# Patient Record
Sex: FEMALE | Race: WHITE | NOT HISPANIC OR LATINO | Employment: OTHER | URBAN - METROPOLITAN AREA
[De-identification: names, ages, dates, MRNs, and addresses within clinical notes are randomized per-mention and may not be internally consistent; named-entity substitution may affect disease eponyms.]

---

## 2017-01-05 ENCOUNTER — THERAPY VISIT (OUTPATIENT)
Dept: PHYSICAL THERAPY | Facility: CLINIC | Age: 62
End: 2017-01-05
Payer: COMMERCIAL

## 2017-01-05 DIAGNOSIS — M25.511 ACUTE PAIN OF RIGHT SHOULDER: Primary | ICD-10-CM

## 2017-01-05 PROCEDURE — 97140 MANUAL THERAPY 1/> REGIONS: CPT | Mod: GP | Performed by: PHYSICAL THERAPIST

## 2017-01-05 PROCEDURE — 97110 THERAPEUTIC EXERCISES: CPT | Mod: GP | Performed by: PHYSICAL THERAPIST

## 2017-04-13 NOTE — PROGRESS NOTES
Subjective:    HPI                    Objective:    System    Physical Exam    General     ROS    Assessment/Plan:      DISCHARGE REPORT    Progress reporting period is from 12/16/16 to 1/5/17.       SUBJECTIVE  Subjective changes noted by patient:  Unknown. Patient has failed to return to clinic.    Current pain level is unknown. Patient has failed to return to clinic.  .     Initial Pain level: 5/10.   Changes in function:  Unknown. Patient has failed to return to clinic.  Adverse reaction to treatment or activity: Unknown. Patient has failed to return to clinic.    OBJECTIVE  Changes noted in objective findings:  Patient has failed to return to therapy so current objective findings are unknown.    ASSESSMENT/PLAN  Updated problem list and treatment plan: Diagnosis 1:  Sprain of the Right Rotator Cuff  STG/LTGs have been met or progress has been made towards goals:  Unknown. Patient has failed to return to clinic.  Assessment of Progress: The patient has not returned to therapy. Current status is unknown.  Self Management Plans:  Patient has been instructed in a home treatment program.  Gianna continues to require the following intervention to meet STG and LTG's:  Unknown. Patient has failed to return to clinic.    Recommendations:  Unable to make an accurate recommendation at this time. Patient has failed to return to clinic.    Please refer to the daily flowsheet for treatment today, total treatment time and time spent performing 1:1 timed codes.

## 2017-05-05 ENCOUNTER — OFFICE VISIT (OUTPATIENT)
Dept: FAMILY MEDICINE | Facility: CLINIC | Age: 62
End: 2017-05-05

## 2017-05-05 VITALS
DIASTOLIC BLOOD PRESSURE: 58 MMHG | WEIGHT: 153.6 LBS | SYSTOLIC BLOOD PRESSURE: 106 MMHG | OXYGEN SATURATION: 98 % | HEART RATE: 62 BPM | HEIGHT: 66 IN | BODY MASS INDEX: 24.68 KG/M2 | TEMPERATURE: 97.6 F

## 2017-05-05 DIAGNOSIS — Z71.89 ACP (ADVANCE CARE PLANNING): ICD-10-CM

## 2017-05-05 DIAGNOSIS — E03.9 ACQUIRED HYPOTHYROIDISM: Primary | ICD-10-CM

## 2017-05-05 PROCEDURE — 84439 ASSAY OF FREE THYROXINE: CPT | Mod: 90 | Performed by: PHYSICIAN ASSISTANT

## 2017-05-05 PROCEDURE — 84443 ASSAY THYROID STIM HORMONE: CPT | Mod: 90 | Performed by: PHYSICIAN ASSISTANT

## 2017-05-05 PROCEDURE — 99213 OFFICE O/P EST LOW 20 MIN: CPT | Performed by: PHYSICIAN ASSISTANT

## 2017-05-05 PROCEDURE — 36415 COLL VENOUS BLD VENIPUNCTURE: CPT | Performed by: PHYSICIAN ASSISTANT

## 2017-05-05 RX ORDER — LEVOTHYROXINE SODIUM 112 UG/1
112 TABLET ORAL DAILY
Qty: 90 TABLET | Refills: 3 | Status: SHIPPED | OUTPATIENT
Start: 2017-05-05 | End: 2018-03-20

## 2017-05-05 NOTE — NURSING NOTE
Isidra WOODSON Aubreedevorah is here today for a non fasting medication check.    Pre-Visit Screening :  Immunizations : up to date  Colonoscopy : is up to date  Mammogram : is up to date  Asthma Action Test/Plan : NA  PHQ9/GAD7 :  NA  Pulse - regular  My Chart - accepts    CLASSIFICATION OF OVERWEIGHT AND OBESITY BY BMI                         Obesity Class           BMI(kg/m2)  Underweight                                    < 18.5  Normal                                         18.5-24.9  Overweight                                     25.0-29.9  OBESITY                     I                  30.0-34.9                              II                 35.0-39.9  EXTREME OBESITY             III                >40                             Patient's  BMI Body mass index is 25.17 kg/(m^2).  http://hin.nhlbi.nih.gov/menuplanner/menu.cgi  Questioned patient about current smoking habits.  Pt. has never smoked.  Glendy Rahman, CMA

## 2017-05-05 NOTE — PROGRESS NOTES
SUBJECTIVE:                                                    Isidra Tomas is a 61 year old female who presents to clinic today for the following health issues:    Hypothyroidism Follow-up      Since last visit, patient describes the following symptoms: , no hair loss, no skin changes, no constipation, no loose stools  Wt Readings from Last 5 Encounters:   05/05/17 69.7 kg (153 lb 9.6 oz)   12/08/16 69.3 kg (152 lb 12.8 oz)   05/06/16 68.5 kg (151 lb)   12/17/15 67 kg (147 lb 12.8 oz)   09/23/15 67.4 kg (148 lb 9.6 oz)                Amount of exercise or daily activities, outside of work: walking    Problems taking medications regularly No    Medication side effects: No        ROS:  Constitutional, HEENT, cardiovascular, pulmonary, gi and gu systems are negative, except as otherwise noted.    Problem list, Medication list, Allergies, and Medical/Social/Surgical histories reviewed in [facility] and updated as appropriate.  Labs reviewed in EPIC  BP Readings from Last 3 Encounters:   05/05/17 106/58   12/08/16 120/76   05/06/16 110/70    Wt Readings from Last 3 Encounters:   05/05/17 69.7 kg (153 lb 9.6 oz)   12/08/16 69.3 kg (152 lb 12.8 oz)   05/06/16 68.5 kg (151 lb)                  Patient Active Problem List   Diagnosis     Hypothyroidism     Lumbago     Family history of other cardiovascular diseases     Absence of menstruation     ACP (advance care planning)     Health Care Home     Past Surgical History:   Procedure Laterality Date     COLONOSCOPY  7/31/2014    Normal/ repeat in 10 yrs     HC COLONOSCOPY THRU STOMA, DIAGNOSTIC  6/2005    Normal/ [place] [facility]: repeat in 10 yrs       Social History   Substance Use Topics     Smoking status: Never Smoker     Smokeless tobacco: Never Used     Alcohol use 1.0 oz/week     2 Standard drinks or equivalent per week      Comment: occasional, 2 oz per week     Family History   Problem Relation Age of Onset     Thyroid Disease Maternal Grandmother      HEART DISEASE Father   "     at age 47     Lipids Sister      two sisters with heart disease     HEART DISEASE Mother      valvular disease     Eye Disorder Mother      macular degeneration     C.A.D. Brother       69 yo, [place]         Current Outpatient Prescriptions   Medication Sig Dispense Refill     levothyroxine (SYNTHROID/LEVOTHROID) 112 MCG tablet Take 1 tablet (112 mcg) by mouth daily 90 tablet 3     Cholecalciferol (VITAMIN D) 1000 UNIT capsule Take 1 capsule by mouth daily.       fish oil-omega-3 fatty acids (FISH OIL) 1000 MG capsule Take 2 g by mouth daily.       Multiple Vitamins-Calcium (ONE-A-DAY WOMENS FORMULA) TABS Take 1 tablet by mouth daily.       [DISCONTINUED] levothyroxine (SYNTHROID/LEVOTHROID) 112 MCG tablet Take 1 tablet (112 mcg) by mouth daily 30 tablet 0     Allergies   Allergen Reactions     Erythromycin      does not tolerate well     Recent Labs   Lab Test  16   1206  09/23/15   1407   14   1048   11   0600  11   0500   LDL   --    --    --   127   --    --   106   HDL   --    --    --   94   --    --   85   TRIG   --    --    --   68   --    --   75   CR   --    --    --   0.70   --   0.73   --    GFRESTIMATED   --    --    --   95   --   92   --    POTASSIUM   --    --    --   4.0   --   4.1   --    TSH  0.40  0.48   < >   --    < >   --   0.66    < > = values in this interval not displayed.        OBJECTIVE:                                                    /58 (BP Location: Left arm, Patient Position: Chair, Cuff Size: Adult Large)  Pulse 62  Temp 97.6  F (36.4  C) (Oral)  Ht 1.664 m (5' 5.5\")  Wt 69.7 kg (153 lb 9.6 oz)  LMP 2007  SpO2 98%  Breastfeeding? No  BMI 25.17 kg/m2   Body mass index is 25.17 kg/(m^2).   GENERAL: healthy, alert, well nourished, well hydrated, no distress  HENT: ear canals- normal; TMs- normal; Nose- normal; Mouth- no ulcers, no lesions  NECK: no tenderness, no adenopathy, no asymmetry, no masses, no stiffness; thyroid- normal " to palpation  RESP: lungs clear to auscultation - no rales, no rhonchi, no wheezes  CV: regular rates and rhythm, normal S1 S2, no S3 or S4 and no murmur, no click or rub -         ASSESSMENT/PLAN:                                                    Hypothyroidism; controlled/euthyroid   Plan:  No changes in the patient's current treatment plan      FU for fasting CPE in 2-3 months.       FANNY Fernandez  [facility]

## 2017-05-06 LAB
T4, FREE, NON-DIALYSIS - QUEST: 1.3 NG/DL (ref 0.8–1.8)
TSH SERPL-ACNC: 0.83 MIU/L (ref 0.4–4.5)

## 2017-05-08 ENCOUNTER — TRANSFERRED RECORDS (OUTPATIENT)
Dept: FAMILY MEDICINE | Facility: CLINIC | Age: 62
End: 2017-05-08

## 2017-07-12 ENCOUNTER — OFFICE VISIT (OUTPATIENT)
Dept: FAMILY MEDICINE | Facility: CLINIC | Age: 62
End: 2017-07-12

## 2017-07-12 VITALS
RESPIRATION RATE: 20 BRPM | HEART RATE: 76 BPM | BODY MASS INDEX: 24.75 KG/M2 | DIASTOLIC BLOOD PRESSURE: 72 MMHG | HEIGHT: 66 IN | WEIGHT: 154 LBS | SYSTOLIC BLOOD PRESSURE: 124 MMHG | TEMPERATURE: 97.9 F

## 2017-07-12 DIAGNOSIS — L98.9 SKIN LESION: Primary | ICD-10-CM

## 2017-07-12 PROCEDURE — 99213 OFFICE O/P EST LOW 20 MIN: CPT | Performed by: PHYSICIAN ASSISTANT

## 2017-07-12 NOTE — PROGRESS NOTES
"CC: Lump behind ear    History:  For the past 4 days, Nini has been noticing a lump that is behind left ear. She has a dull ache behind ear, but no pain in actual ear. Tried icing, and took ibuprofen intermittently. No ear pain. No hearing changes. Spent a lot of time outdoors over the weekend, so wondering if she could have been exposed to something. No SOB. No history of ear problems.    PMH, MEDICATIONS, ALLERGIES, SOCIAL AND FAMILY HISTORY in [facility] and reviewed by me personally.      ROS negative other than the symptoms noted above in the HPI.        Examination   /72 (BP Location: Left arm, Patient Position: Chair, Cuff Size: Adult Regular)  Pulse 76  Temp 97.9  F (36.6  C) (Oral)  Resp 20  Ht 1.664 m (5' 5.5\")  Wt 69.9 kg (154 lb)  LMP 03/20/2007  Breastfeeding? No  BMI 25.24 kg/m2       Constitutional: Sitting comfortably, in no acute distress. Vital signs noted  Eyes: pupils equal round reactive to light and accomodation, extra ocular movements intact  Ears: external canals and TMs free of abnormalities  Mouth and throat: without erythema or lesions of the mucosa  Neck:  no adenopathy, trachea midline and normal to palpation  Cardiovascular:  regular rate and rhythm, no murmurs, clicks, or gallops  Respiratory:  normal respiratory rate and rhythm, lungs clear to auscultation  SKIN: No jaundice/pallor. Small, erythematous raised cystic lesion behind left ear. Mobile. Feels fluid filled. No drainage. No vesicular appearance  Psychiatric: mentation appears normal and affect normal/bright        A/P    ICD-10-CM    1. Skin lesion L98.9        DISCUSSION: This appear to be a benign skin swelling likely due to an insect bite. I recommended icing 2 times daily, and taking Allegra or Zyrtec. She should avoid touching the lesion as much as possible. She can take ibuprofen as needed with food to help with any pain. She should contact me in 1 week if not significantly improved, or sooner if " worsening.    follow up visit: As needed    GILMA [facility]

## 2017-07-12 NOTE — MR AVS SNAPSHOT
"              After Visit Summary   7/12/2017    Isidra Tomas    MRN: [id number]           Patient Information     Date Of Birth          1955        Visit Information        Provider Department      7/12/2017 10:00 AM Elaina Baker PA-C [facility]        Today's Diagnoses     Skin lesion    -  1       Follow-ups after your visit        Follow-up notes from your care team     Return in about 2 weeks (around 7/26/2017).      Who to contact     If you have questions or need follow up information about today's clinic visit or your schedule please contact [facility] directly at [phone].  Normal or non-critical lab and imaging results will be communicated to you by MyChart, letter or phone within 4 business days after the clinic has received the results. If you do not hear from us within 7 days, please contact the clinic through GMEXhart or phone. If you have a critical or abnormal lab result, we will notify you by phone as soon as possible.  Submit refill requests through MailLift or call your pharmacy and they will forward the refill request to us. Please allow 3 business days for your refill to be completed.          Additional Information About Your Visit        MyChart Information     MailLift gives you secure access to your electronic health record. If you see a primary care provider, you can also send messages to your care team and make appointments. If you have questions, please call your primary care clinic.  If you do not have a primary care provider, please call [phone] and they will assist you.        Care EveryWhere ID     This is your Care EveryWhere ID. This could be used by other organizations to access your [place] medical records  [id number]        Your Vitals Were     Pulse Temperature Respirations Height Last Period Breastfeeding?    76 97.9  F (36.6  C) (Oral) 20 1.664 m (5' 5.5\") 03/20/2007 No    BMI (Body Mass Index)          "          25.24 kg/m2            Blood Pressure from Last 3 Encounters:   07/12/17 124/72   05/05/17 106/58   12/08/16 120/76    Weight from Last 3 Encounters:   07/12/17 69.9 kg (154 lb)   05/05/17 69.7 kg (153 lb 9.6 oz)   12/08/16 69.3 kg (152 lb 12.8 oz)              Today, you had the following     No orders found for display       Primary Care Provider Office Phone # Fax #    Angelique Bajwa [phone]       [facility] [address]  [facility] [address]        Equal Access to Services     ANJALI BAUER : Hadii renny Sanchez, waaxda deangelo, qaybta kaalmada elizabeth, pelra bustamante . So [facility] [phone].    ATENCIÓN: Si habla español, tiene a lozoya disposición servicios gratuitos de asistencia lingüística. Llame al [phone].    We comply with applicable federal civil rights laws and [place] laws. We do not discriminate on the basis of race, color, national origin, age, disability sex, sexual orientation or gender identity.            Thank you!     Thank you for choosing [facility]  for your care. Our goal is always to provide you with excellent care. Hearing back from our patients is one way we can continue to improve our services. Please take a few minutes to complete the written survey that you may receive in the mail after your visit with us. Thank you!             Your Updated Medication List - Protect others around you: Learn how to safely use, store and throw away your medicines at www.disposemymeds.org.          This list is accurate as of: 7/12/17  3:22 PM.  Always use your most recent med list.                   Brand Name Dispense Instructions for use Diagnosis    fish oil-omega-3 fatty acids 1000 MG capsule      Take 2 g by mouth daily.        levothyroxine 112 MCG tablet    SYNTHROID/LEVOTHROID    90 tablet    Take 1 tablet (112 mcg) by mouth daily    Acquired hypothyroidism        ONE-A-DAY WOMENS FORMULA Tabs      Take 1 tablet by mouth daily.        vitamin D 1000 UNITS capsule      Take 1 capsule by mouth daily.

## 2017-07-12 NOTE — NURSING NOTE
Questioned patient about current smoking habits.  Pt. has never smoked.  PULSE regular  My Chart: stella  CLASSIFICATION OF OVERWEIGHT AND OBESITY BY BMI                        Obesity Class           BMI(kg/m2)  Underweight                                    < 18.5  Normal                                         18.5-24.9  Overweight                                     25.0-29.9  OBESITY                     I                  30.0-34.9                             II                 35.0-39.9  EXTREME OBESITY             III                >40                            Patient's  BMI Body mass index is 25.24 kg/(m^2).  http://hin.nhlbi.nih.gov/menuplanner/menu.cgi  Pre-visit planning  Immunizations - up to date  Colonoscopy - is up to date  Mammogram - is up to date  Asthma -   PHQ9 -    JEET-7 -

## 2017-08-12 ENCOUNTER — HEALTH MAINTENANCE LETTER (OUTPATIENT)
Age: 62
End: 2017-08-12

## 2017-10-20 ENCOUNTER — ALLIED HEALTH/NURSE VISIT (OUTPATIENT)
Dept: FAMILY MEDICINE | Facility: CLINIC | Age: 62
End: 2017-10-20

## 2017-10-20 DIAGNOSIS — Z23 NEED FOR VACCINATION: Primary | ICD-10-CM

## 2017-10-20 PROCEDURE — 90471 IMMUNIZATION ADMIN: CPT | Performed by: FAMILY MEDICINE

## 2017-10-20 PROCEDURE — 90686 IIV4 VACC NO PRSV 0.5 ML IM: CPT | Performed by: FAMILY MEDICINE

## 2017-10-20 NOTE — MR AVS SNAPSHOT
After Visit Summary   10/20/2017    Isidra Tomas    MRN: [id number]           Patient Information     Date Of Birth          1955        Visit Information        Provider Department      10/20/2017 10:00 AM Angelique Bajwa MD [facility]        Today's Diagnoses     Need for vaccination    -  1       Follow-ups after your visit        Your next 10 appointments already scheduled     Nov 16, 2017 10:00 AM CST   Physical-Complete with Angelique Bajwa MD   [facility] ([facility])    [address]  [address]  [facility] [id number] [phone]           Instruct patient that they should have nothing(except water) after midnight the evening prior to the appointment.              Who to contact     If you have questions or need follow up information about today's clinic visit or your schedule please contact [facility] directly at [phone].  Normal or non-critical lab and imaging results will be communicated to you by Obeohart, letter or phone within 4 business days after the clinic has received the results. If you do not hear from us within 7 days, please contact the clinic through LaunchSidet or phone. If you have a critical or abnormal lab result, we will notify you by phone as soon as possible.  Submit refill requests through Artifact Technologies or call your pharmacy and they will forward the refill request to us. Please allow 3 business days for your refill to be completed.          Additional Information About Your Visit        Obeohart Information     [employer] gives you secure access to your electronic health record. If you see a primary care provider, you can also send messages to your care team and make appointments. If you have questions, please call your primary care clinic.  If you do not have a primary care provider, please call [phone] and they will assist you.        Care EveryWhere ID      This is your Care EveryWhere ID. This could be used by other organizations to access your [place] medical records  [id number]        Your Vitals Were     Last Period                   03/20/2007            Blood Pressure from Last 3 Encounters:   07/12/17 124/72   05/05/17 106/58   12/08/16 120/76    Weight from Last 3 Encounters:   07/12/17 69.9 kg (154 lb)   05/05/17 69.7 kg (153 lb 9.6 oz)   12/08/16 69.3 kg (152 lb 12.8 oz)              We Performed the Following     HC FLU VAC PRESRV FREE QUAD SPLIT VIR 3+YRS IM     VACCINE ADMINISTRATION, INITIAL        Primary Care Provider Office Phone # Fax #    Angelique Bajwa [phone] [address] [place] [phone]        Equal Access to Services     ANJALI BAUER : Hadii aad ku hadasho Soomaali, waaxda luqadaha, qaybta kaalmada adeegyada, perla fordin hayaan priya bustamante . So [facility] [phone].    ATENCIÓN: Si habla español, tiene a lozoya disposición servicios gratuitos de asistencia lingüística. Llame al [phone].    We comply with applicable federal civil rights laws and [place] laws. We do not discriminate on the basis of race, color, national origin, age, disability, sex, sexual orientation, or gender identity.            Thank you!     Thank you for choosing [facility]  for your care. Our goal is always to provide you with excellent care. Hearing back from our patients is one way we can continue to improve our services. Please take a few minutes to complete the written survey that you may receive in the mail after your visit with us. Thank you!             Your Updated Medication List - Protect others around you: Learn how to safely use, store and throw away your medicines at www.disposemymeds.org.          This list is accurate as of: 10/20/17 10:19 AM.  Always use your most recent med list.                   Brand Name Dispense Instructions for use Diagnosis    fish oil-omega-3  fatty acids 1000 MG capsule      Take 2 g by mouth daily.        levothyroxine 112 MCG tablet    SYNTHROID/LEVOTHROID    90 tablet    Take 1 tablet (112 mcg) by mouth daily    Acquired hypothyroidism       ONE-A-DAY WOMENS FORMULA Tabs      Take 1 tablet by mouth daily.        vitamin D 1000 UNITS capsule      Take 1 capsule by mouth daily.

## 2017-11-16 ENCOUNTER — OFFICE VISIT (OUTPATIENT)
Dept: FAMILY MEDICINE | Facility: CLINIC | Age: 62
End: 2017-11-16

## 2017-11-16 VITALS
WEIGHT: 155.8 LBS | HEART RATE: 60 BPM | HEIGHT: 66 IN | SYSTOLIC BLOOD PRESSURE: 128 MMHG | DIASTOLIC BLOOD PRESSURE: 88 MMHG | TEMPERATURE: 97.4 F | BODY MASS INDEX: 25.04 KG/M2 | OXYGEN SATURATION: 98 %

## 2017-11-16 DIAGNOSIS — Z82.49 FAMILY HISTORY OF ISCHEMIC HEART DISEASE: ICD-10-CM

## 2017-11-16 DIAGNOSIS — Z01.419 ENCOUNTER FOR GYNECOLOGICAL EXAMINATION WITHOUT ABNORMAL FINDING: Primary | ICD-10-CM

## 2017-11-16 LAB
% GRANULOCYTES: 61.4 %
HCT VFR BLD AUTO: 45.3 % (ref 35–47)
HEMOGLOBIN: 14.4 G/DL (ref 11.7–15.7)
LYMPHOCYTES NFR BLD AUTO: 30 %
MCH RBC QN AUTO: 30.3 PG (ref 26–33)
MCHC RBC AUTO-ENTMCNC: 31.8 G/DL (ref 31–36)
MCV RBC AUTO: 95.1 FL (ref 78–100)
MONOCYTES NFR BLD AUTO: 8.6 %
PLATELET COUNT - QUEST: 309 10^9/L (ref 150–375)
RBC # BLD AUTO: 4.76 10*12/L (ref 3.8–5.2)
WBC # BLD AUTO: 6.4 10*9/L (ref 4–11)

## 2017-11-16 PROCEDURE — 99396 PREV VISIT EST AGE 40-64: CPT | Performed by: FAMILY MEDICINE

## 2017-11-16 PROCEDURE — 36415 COLL VENOUS BLD VENIPUNCTURE: CPT | Performed by: FAMILY MEDICINE

## 2017-11-16 PROCEDURE — 85025 COMPLETE CBC W/AUTO DIFF WBC: CPT | Performed by: FAMILY MEDICINE

## 2017-11-16 PROCEDURE — 80061 LIPID PANEL: CPT | Mod: 90 | Performed by: FAMILY MEDICINE

## 2017-11-16 PROCEDURE — 88142 CYTOPATH C/V THIN LAYER: CPT | Mod: 90 | Performed by: FAMILY MEDICINE

## 2017-11-16 PROCEDURE — 80048 BASIC METABOLIC PNL TOTAL CA: CPT | Mod: 90 | Performed by: FAMILY MEDICINE

## 2017-11-16 NOTE — PROGRESS NOTES
Chief Complaint: Isidra Tomas is an 62 year old woman who presents for preventive health visit.      Besides routine health maintenance,  she would like to discuss    Family history of heart disease- .     Brother  of heart attack 70  Younger sister had heart attack 2014-no coronary artery disease on angio  Dad  of heart attack age 48  Mother  95- natural causes- post hip fracture     recently retired- change in lifestyle-      Health Care Maintenance   Pap smear : due  Mammogram 2017; no family history of breast cancer- diagnostic mammogram a couple of years ago  Colonoscopy   Lipid 2014 Normal    dexa at age 64  Healthy Habits:  Do you get at least three servings of calcium containing foods daily (dairy, green leafy vegetables, etc.)? Yes  Vitamin D  Outside of work or daily activities, how many days per week do you exercise for 30 minutes or longer? Encouraged daily exericse  Have you had an eye exam in the past two years? yes  Do you see a dentist twice per year? yes    PHQ-2  Over the last two weeks- Have you been bothered by little interest or pleasure in doing things?  No  Over the last two weeks- Have you been feeling down, depressed, or hopeless?  No      Advanced directive: completed - bring    Social History   Substance Use Topics     Smoking status: Never Smoker     Smokeless tobacco: Never Used     Alcohol use 1.0 oz/week     2 Standard drinks or equivalent per week      Comment: occasional, 2 oz per week         Reviewed orders with patient.  Reviewed health maintenance and updated orders accordingly - Yes      History of abnormal Pap smear: NO - age 30- 65 PAP every 3 years recommended  All Histories reviewed and updated in Epic.      ROS:   ROS: 7 point ROS neg other than the symptoms noted above in the HPI.  hemorrhoids are bothersome at times- history of constipation     OBJECTIVE:  /88 (BP Location: Left arm, Patient Position: Chair, Cuff Size: Adult Regular)   "Pulse 60  Temp 97.4  F (36.3  C) (Oral)  Ht 1.67 m (5' 5.75\")  Wt 70.7 kg (155 lb 12.8 oz)  LMP 03/20/2007  SpO2 98%  Breastfeeding? No  BMI 25.34 kg/m2  General appearance: Healthy    Skin: Normal. No atypical appearing moles on inspection of trunk and extremities.    External ears  and canals clear bilaterally. TM's normal bilaterally. Nose normal without lesions or discharge. Oropharynx normal. Neck supple without palpable adenopathy.    Breasts are symmetric.  No dominant, discrete, fixed  or suspicious masses are noted.  No skin or nipple changes or axillary nodes.      Regular rate and  rhythm. S1 and S2 normal, no murmurs, clicks, gallops or rubs. No edema or JVD. Chest is clear; no wheezes or rales.    The abdomen is soft without tenderness, guarding, mass or organomegaly. Bowel sounds are normal. No CVA tenderness or inguinal adenopathy noted.    Pelvic:  Vagina and vulva are normal.   No palpable uterine or adnexal masses or tenderness.  Pap obtained and pending.    Rectal exam: normal anus- no digital exam    Extremities: negative.      COUNSELING:  Reviewed preventive health counseling, as reflected in patient instructions       Regular exercise       Healthy diet/nutrition       Osteoporosis Prevention/Bone Health       Advance Care Planning      ATP III Guidelines  ICSI Preventive Guidelines    ASSESSMENT/PLAN:  (Z01.419) Encounter for gynecological examination without abnormal finding  (primary encounter diagnosis)  Comment:   Plan: ThinPrep Pap and HPV (mRNA E6/E7)         ([facility]), BASIC METABOLIC PANEL ([facility]), CL AFF         HEMOGRAM/PLATE/DIFF ([facility]), VENOUS COLLECTION,         Lipid Profile ([facility])            (Z82.49) Family history of ischemic heart disease  Comment:   Plan: CT Coronary Calcium Scan            "

## 2017-11-16 NOTE — MR AVS SNAPSHOT
After Visit Summary   11/16/2017    Isidra Tomas    MRN: [id number]           Patient Information     Date Of Birth          1955        Visit Information        Provider Department      11/16/2017 10:00 AM Angelique Bajwa MD [facility]        Today's Diagnoses     Encounter for gynecological examination without abnormal finding    -  1    Family history of ischemic heart disease          Care Instructions    Metamucil: one or two tablespoons daily with lots of water:    Dried prunes: 2 prunes twice a day          Follow-ups after your visit        Future tests that were ordered for you today     Open Future Orders        Priority Expected Expires Ordered    CT Coronary Calcium Scan Routine 11/17/2017 11/16/2018 11/16/2017            Who to contact     If you have questions or need follow up information about today's clinic visit or your schedule please contact [facility] directly at [phone].  Normal or non-critical lab and imaging results will be communicated to you by MyChart, letter or phone within 4 business days after the clinic has received the results. If you do not hear from us within 7 days, please contact the clinic through [employer] or phone. If you have a critical or abnormal lab result, we will notify you by phone as soon as possible.  Submit refill requests through [employer] or call your pharmacy and they will forward the refill request to us. Please allow 3 business days for your refill to be completed.          Additional Information About Your Visit        MyChart Information     [employer] gives you secure access to your electronic health record. If you see a primary care provider, you can also send messages to your care team and make appointments. If you have questions, please call your primary care clinic.  If you do not have a primary care provider, please call [phone] and they will assist you.        Care EveryWhere ID   "   This is your Care EveryWhere ID. This could be used by other organizations to access your [facility] medical records  [id number]        Your Vitals Were     Pulse Temperature Height Last Period Pulse Oximetry Breastfeeding?    60 97.4  F (36.3  C) (Oral) 1.67 m (5' 5.75\") 03/20/2007 98% No    BMI (Body Mass Index)                   25.34 kg/m2            Blood Pressure from Last 3 Encounters:   11/16/17 128/88   07/12/17 124/72   05/05/17 106/58    Weight from Last 3 Encounters:   11/16/17 70.7 kg (155 lb 12.8 oz)   07/12/17 69.9 kg (154 lb)   05/05/17 69.7 kg (153 lb 9.6 oz)              We Performed the Following     BASIC METABOLIC PANEL ([facility])     CL AFF HEMOGRAM/PLATE/DIFF ([facility])     Lipid Profile ([facility])     ThinPrep Pap and HPV (mRNA E6/E7){HPV-REFLEX} ([facility])     VENOUS COLLECTION        Primary Care Provider Office Phone # Fax #    Angelique Bajwa [phone]       [address] [address]        Equal Access to Services     ANJALI BAUER : Hadii renny adameso Sodionna, waaxda luqadaha, qaybta kaalmada adeegyada, perla hernandez. So [facility] [phone].    ATENCIÓN: Si habla español, tiene a lozoya disposición servicios gratuitos de asistencia lingüística. [facility] [phone].    We comply with applicable federal civil rights laws and [place] laws. We do not discriminate on the basis of race, color, national origin, age, disability, sex, sexual orientation, or gender identity.            Thank you!     Thank you for choosing [facility]  for your care. Our goal is always to provide you with excellent care. Hearing back from our patients is one way we can continue to improve our services. Please take a few minutes to complete the written survey that you may receive in the mail after your visit with us. Thank you!             Your Updated Medication List - Protect others around you: Learn how to safely use, store " and throw away your medicines at www.disposemymeds.org.          This list is accurate as of: 11/16/17 10:44 AM.  Always use your most recent med list.                   Brand Name Dispense Instructions for use Diagnosis    fish oil-omega-3 fatty acids 1000 MG capsule      Take 2 g by mouth daily.        levothyroxine 112 MCG tablet    SYNTHROID/LEVOTHROID    90 tablet    Take 1 tablet (112 mcg) by mouth daily    Acquired hypothyroidism       ONE-A-DAY WOMENS FORMULA Tabs      Take 1 tablet by mouth daily.        vitamin D 1000 UNITS capsule      Take 1 capsule by mouth daily.

## 2017-11-16 NOTE — NURSING NOTE
Isidra is here for CPX    Patient is here for a full physical exam.    Pre-Visit Screening :  Immunizations : up to date  Colon Screening : is up to date  Mammogram: UTD  Asthma Action Test/Plan : NA  PHQ9/GAD7 :  None  Fall Risk Assessment is up to date    Vitals:  Pulse - regular  My Chart - accepts    CLASSIFICATION OF OVERWEIGHT AND OBESITY BY BMI                         Obesity Class           BMI(kg/m2)  Underweight                                    < 18.5  Normal                                         18.5-24.9  Overweight                                     25.0-29.9  OBESITY                     I                  30.0-34.9                              II                 35.0-39.9  EXTREME OBESITY             III                >40                             Patient's  BMI Body mass index is 25.34 kg/(m^2).  http://hin.nhlbi.nih.gov/menuplanner/menu.cgi  Questioned patient about current smoking habits.  Pt. has never smoked.    ETOH screening:  Questions:  1-How often do you have a drink containing alcohol?                             3 times per week(s)  2-How many drinks containing alcohol do you have on a typical day when you are         Drinking?                              2   3- How often do you have 5 or more drinks on one occasion?                              Never  Have you ever:  None of the patient's responses to the CAGE screening were positive / Negative CAGE score

## 2017-11-17 LAB
BUN SERPL-MCNC: 16 MG/DL (ref 7–25)
BUN/CREATININE RATIO: NORMAL (CALC) (ref 6–22)
CALCIUM SERPL-MCNC: 9.4 MG/DL (ref 8.6–10.4)
CHLORIDE SERPLBLD-SCNC: 104 MMOL/L (ref 98–110)
CHOLEST SERPL-MCNC: 231 MG/DL
CHOLEST/HDLC SERPL: 2.3 (CALC)
CO2 SERPL-SCNC: 26 MMOL/L (ref 20–31)
CREAT SERPL-MCNC: 0.67 MG/DL (ref 0.5–0.99)
EGFR AFRICAN AMERICAN - QUEST: 108 ML/MIN/1.73M2
GFR SERPL CREATININE-BSD FRML MDRD: 94 ML/MIN/1.73M2
GLUCOSE - QUEST: 86 MG/DL (ref 65–99)
HDLC SERPL-MCNC: 100 MG/DL
LDLC SERPL CALC-MCNC: 117 MG/DL (CALC)
NONHDLC SERPL-MCNC: 131 MG/DL (CALC)
POTASSIUM SERPL-SCNC: 3.9 MMOL/L (ref 3.5–5.3)
SODIUM SERPL-SCNC: 141 MMOL/L (ref 135–146)
TRIGL SERPL-MCNC: 53 MG/DL

## 2017-11-22 LAB
CLINICAL HISTORY - QUEST: NORMAL
CYTOTECHNOLOGIST - QUEST: NORMAL
DESCRIPTIVE DIAGNOSIS - QUEST: NORMAL
LAST PAP DX - QUEST: NORMAL
LMP - QUEST: NORMAL
PREV BX DX - QUEST: NORMAL
SOURCE: NORMAL
STATEMENT OF ADEQUACY - QUEST: NORMAL

## 2017-12-07 ENCOUNTER — HOSPITAL ENCOUNTER (OUTPATIENT)
Dept: CT IMAGING | Facility: CLINIC | Age: 62
Discharge: HOME | End: 2017-12-07
Attending: FAMILY MEDICINE | Admitting: FAMILY MEDICINE
Payer: COMMERCIAL

## 2017-12-07 DIAGNOSIS — Z82.49 FAMILY HISTORY OF ISCHEMIC HEART DISEASE: ICD-10-CM

## 2017-12-07 PROCEDURE — 75571 CT HRT W/O DYE W/CA TEST: CPT

## 2017-12-07 PROCEDURE — 75571 CT HRT W/O DYE W/CA TEST: CPT | Mod: 26 | Performed by: INTERNAL MEDICINE

## 2018-03-20 DIAGNOSIS — E03.9 ACQUIRED HYPOTHYROIDISM: ICD-10-CM

## 2018-03-21 RX ORDER — LEVOTHYROXINE SODIUM 112 UG/1
TABLET ORAL
Qty: 90 TABLET | Refills: 0 | Status: SHIPPED | OUTPATIENT
Start: 2018-03-21 | End: 2018-06-07

## 2018-03-21 NOTE — TELEPHONE ENCOUNTER
Isidra Tomas is requesting a refill of:    Pending Prescriptions:                       Disp   Refills    levothyroxine (SYNTHROID/LEVOTHROID) 112 *90 tab*0            Sig: TAKE 1 TABLET BY MOUTH  DAILY    Needs OV for refills. This is from her mail order.     TSH   Date Value Ref Range Status   05/05/2017 0.83 0.40 - 4.50 mIU/L Final

## 2018-03-21 NOTE — TELEPHONE ENCOUNTER
Please let the patient know that a 90 day refill has been sent for their prescription.  They are due for a return non-fasting office visit within 30 days.  Failure to schedule an appointment may result in no further refills of his/her medication.

## 2018-05-22 DIAGNOSIS — E03.9 ACQUIRED HYPOTHYROIDISM: ICD-10-CM

## 2018-05-22 RX ORDER — LEVOTHYROXINE SODIUM 112 UG/1
TABLET ORAL
Qty: 90 TABLET | OUTPATIENT
Start: 2018-05-22

## 2018-05-22 RX ORDER — LEVOTHYROXINE SODIUM 112 UG/1
TABLET ORAL
Qty: 30 TABLET | Refills: 0 | Status: CANCELLED | OUTPATIENT
Start: 2018-05-22

## 2018-05-22 NOTE — TELEPHONE ENCOUNTER
Refused Prescriptions:                       Disp   Refills    levothyroxine (SYNTHROID/LEVOTHROID) 112 M*90 tab*         Sig: TAKE 1 TABLET BY MOUTH  DAILY  Refused By: SAMANTA ROSE  Reason for Refusal: Patient needs appointment    This was mail order denied  Will send a 30 to local pharmacy.  [place]  [phone] (home)

## 2018-05-22 NOTE — TELEPHONE ENCOUNTER
Please discuss thyroid refill with patient - ninety days sent to Mail order in March- should have medication for another month  Overdue for thyroid labs-please schedule lab only appointment

## 2018-05-22 NOTE — TELEPHONE ENCOUNTER
Pending Prescriptions:                       Disp   Refills    levothyroxine (SYNTHROID/LEVOTHROID) 112 *30 tab*0            Sig: TAKE 1 TABLET BY MOUTH  DAILY    See RE from 3- pt due for ov  No appt scheduled  Okay for another 30?  Please fax 30 and send to [facility] for a non fasting ov or deny  [place]  [phone] (home)

## 2018-05-22 NOTE — TELEPHONE ENCOUNTER
Spoke with patient and she states that they sent this in error and to please disregard. She stated that she knows she is due for an office visit and still has a month supply of the medication. Closing encounter due to no further action needed.

## 2018-06-07 ENCOUNTER — OFFICE VISIT (OUTPATIENT)
Dept: FAMILY MEDICINE | Facility: CLINIC | Age: 63
End: 2018-06-07

## 2018-06-07 VITALS
WEIGHT: 155.8 LBS | BODY MASS INDEX: 25.04 KG/M2 | SYSTOLIC BLOOD PRESSURE: 128 MMHG | HEIGHT: 66 IN | RESPIRATION RATE: 20 BRPM | TEMPERATURE: 97.9 F | HEART RATE: 52 BPM | DIASTOLIC BLOOD PRESSURE: 82 MMHG

## 2018-06-07 DIAGNOSIS — E03.9 ACQUIRED HYPOTHYROIDISM: Primary | ICD-10-CM

## 2018-06-07 DIAGNOSIS — Z13.9 SCREENING FOR CONDITION: ICD-10-CM

## 2018-06-07 PROCEDURE — 82607 VITAMIN B-12: CPT | Mod: 90 | Performed by: FAMILY MEDICINE

## 2018-06-07 PROCEDURE — 84439 ASSAY OF FREE THYROXINE: CPT | Mod: 90 | Performed by: FAMILY MEDICINE

## 2018-06-07 PROCEDURE — 84443 ASSAY THYROID STIM HORMONE: CPT | Mod: 90 | Performed by: FAMILY MEDICINE

## 2018-06-07 PROCEDURE — 99213 OFFICE O/P EST LOW 20 MIN: CPT | Performed by: FAMILY MEDICINE

## 2018-06-07 PROCEDURE — 36415 COLL VENOUS BLD VENIPUNCTURE: CPT | Performed by: FAMILY MEDICINE

## 2018-06-07 NOTE — MR AVS SNAPSHOT
After Visit Summary   6/7/2018    Isidra Tomas    MRN: [id number]           Patient Information     Date Of Birth          1955        Visit Information        Provider Department      6/7/2018 11:15 AM Angelique Bajwa MD [facility]        Today's Diagnoses     Acquired hypothyroidism    -  1    Screening for condition          Care Instructions    New Recombinant shingles vaccine (Shingrix): call your insurance for information on cost            Follow-ups after your visit        Who to contact     If you have questions or need follow up information about today's clinic visit or your schedule please contact [facility] directly at [phone].  Normal or non-critical lab and imaging results will be communicated to you by MyChart, letter or phone within 4 business days after the clinic has received the results. If you do not hear from us within 7 days, please contact the clinic through [employer] or phone. If you have a critical or abnormal lab result, we will notify you by phone as soon as possible.  Submit refill requests through [employer] or call your pharmacy and they will forward the refill request to us. Please allow 3 business days for your refill to be completed.          Additional Information About Your Visit        MyChart Information     [employer] gives you secure access to your electronic health record. If you see a primary care provider, you can also send messages to your care team and make appointments. If you have questions, please call your primary care clinic.  If you do not have a primary care provider, please call [phone] and they will assist you.        Care EveryWhere ID     This is your Care EveryWhere ID. This could be used by other organizations to access your [facility] medical records  [id number]        Your Vitals Were     Pulse Temperature Respirations Height Last Period BMI (Body Mass Index)    52 97.9  F (36.6  C)  "(Oral) 20 1.67 m (5' 5.75\") 03/20/2007 25.34 kg/m2       Blood Pressure from Last 3 Encounters:   06/07/18 128/82   11/16/17 128/88   07/12/17 124/72    Weight from Last 3 Encounters:   06/07/18 70.7 kg (155 lb 12.8 oz)   11/16/17 70.7 kg (155 lb 12.8 oz)   07/12/17 69.9 kg (154 lb)              We Performed the Following     T4 free     TSH ([facility])     VENOUS COLLECTION     VITAMIN B12 ([facility])          Where to get your medicines      These medications were sent to [facility] - [address]  [address] [place] [address] [address], [facility] [address]     Phone:  [phone]     levothyroxine 112 MCG tablet          Primary Care Provider Office Phone # Fax #    Angelique Bajwa [phone] [address] [address]        Equal Access to Services     [facility]: Hadii renny ku hadasho Soomaali, waaxda luqadaha, qaybta kaalmada adeegyada, waxay luanne haynoemí bustamante . So [facility] [phone].    ATENCIÓN: Si habla español, tiene a lozoya disposición servicios gratuitos de asistencia lingüística. Ll[facility] [phone].    We comply with applicable federal civil rights laws and [place] laws. We do not discriminate on the basis of race, color, national origin, age, disability, sex, sexual orientation, or gender identity.            Thank you!     Thank you for choosing [facility]  for your care. Our goal is always to provide you with excellent care. Hearing back from our patients is one way we can continue to improve our services. Please take a few minutes to complete the written survey that you may receive in the mail after your visit with us. Thank you!             Your Updated Medication List - Protect others around you: Learn how to safely use, store and throw away your medicines at www.disposemymeds.org.          This list is accurate as of 6/7/18 11:59 PM.  Always use your most recent med list.          "          Brand Name Dispense Instructions for use Diagnosis    fish oil-omega-3 fatty acids 1000 MG capsule      Take 2 g by mouth daily.        levothyroxine 112 MCG tablet    SYNTHROID/LEVOTHROID    90 tablet    TAKE 1 TABLET BY MOUTH  DAILY    Acquired hypothyroidism       ONE-A-DAY WOMENS FORMULA Tabs      Take 1 tablet by mouth daily.        vitamin D 1000 units capsule      Take 1 capsule by mouth daily.

## 2018-06-07 NOTE — PROGRESS NOTES
SUBJECTIVE:   Isidra Tomas is a 62 year old female who presents to clinic today for the following health issues:    Hypothyroidism Follow-up: diagnosed in       Since last visit, patient describes the following symptoms: Weight stable, no hair loss, no skin changes, no constipation, no loose stools      Amount of exercise or physical activity: regular walker    Problems taking medications regularly: No    Medication side effects: none    Diet: regular (no restrictions)    Problem list and histories reviewed & adjusted, as indicated.  Additional history: as documented    Patient Active Problem List   Diagnosis     Hypothyroidism     Lumbago     Family history of other cardiovascular diseases     Absence of menstruation     ACP (advance care planning)     Health Care Home     Past Surgical History:   Procedure Laterality Date     COLONOSCOPY  2014    Normal/ repeat in 10 yrs     HC COLONOSCOPY THRU STOMA, DIAGNOSTIC  2005    Normal/ [place] [facility]: repeat in 10 yrs       Social History   Substance Use Topics     Smoking status: Never Smoker     Smokeless tobacco: Never Used     Alcohol use 1.0 oz/week     2 Standard drinks or equivalent per week      Comment: occasional, 2 oz per week     Family History   Problem Relation Age of Onset     Thyroid Disease Maternal Grandmother      HEART DISEASE Father       at age 47     Lipids Sister      two sisters with heart disease     HEART DISEASE Mother      valvular disease     Eye Disorder Mother      macular degeneration     C.A.D. Brother       71 yo, [place]         Current Outpatient Prescriptions   Medication Sig Dispense Refill     Cholecalciferol (VITAMIN D) 1000 UNIT capsule Take 1 capsule by mouth daily.       fish oil-omega-3 fatty acids (FISH OIL) 1000 MG capsule Take 2 g by mouth daily.       levothyroxine (SYNTHROID/LEVOTHROID) 112 MCG tablet TAKE 1 TABLET BY MOUTH  DAILY 90 tablet 3     Multiple Vitamins-Calcium (ONE-A-DAY WOMENS FORMULA) TABS Take  "1 tablet by mouth daily.         Reviewed and updated as needed this visit by clinical staff       Reviewed and updated as needed this visit by Provider         ROS:  Constitutional, HEENT, cardiovascular, pulmonary, gi and gu systems are negative, except as otherwise noted.    OBJECTIVE:     /82 (BP Location: Left arm, Patient Position: Chair, Cuff Size: Adult Regular)  Pulse 52  Temp 97.9  F (36.6  C) (Oral)  Resp 20  Ht 1.67 m (5' 5.75\")  Wt 70.7 kg (155 lb 12.8 oz)  LMP 03/20/2007  BMI 25.34 kg/m2  Body mass index is 25.34 kg/(m^2).   GENERAL: healthy, alert and no distress  NECK: no adenopathy, no asymmetry, masses, or scars and thyroid normal to palpation  RESP: lungs clear to auscultation - no rales, rhonchi or wheezes  CV: regular rate and rhythm, normal S1 S2, no S3 or S4, no murmur, click or rub, no peripheral edema and peripheral pulses strong    Diagnostic Test Results:  Results for orders placed or performed in visit on 06/07/18   TSH ([facility])   Result Value Ref Range    TSH 1.42 0.40 - 4.50 mIU/L   T4 free   Result Value Ref Range    T4 Free, Non-Dialysis 1.4 0.8 - 1.8 ng/dL   VITAMIN B12 ([facility])   Result Value Ref Range    Vitamin B12 446 200 - 1100 pg/mL       ASSESSMENT/PLAN:     Problem List Items Addressed This Visit     Hypothyroidism - Primary    Relevant Medications    levothyroxine (SYNTHROID/LEVOTHROID) 112 MCG tablet      Other Visit Diagnoses     Screening for condition        Relevant Orders    VITAMIN B12 ([facility]) (Completed)           MEDICATIONS:  Continue current medications without change  FUTURE APPOINTMENTS:       - Follow-up visit in 12 months    Angelique Bajwa MD  [facility]  "

## 2018-06-08 LAB
T4, FREE, NON-DIALYSIS - QUEST: 1.4 NG/DL (ref 0.8–1.8)
TSH SERPL-ACNC: 1.42 MIU/L (ref 0.4–4.5)
VIT B12 SERPL-MCNC: 446 PG/ML (ref 200–1100)

## 2018-06-08 RX ORDER — LEVOTHYROXINE SODIUM 112 UG/1
TABLET ORAL
Qty: 90 TABLET | Refills: 3 | Status: SHIPPED | OUTPATIENT
Start: 2018-06-08 | End: 2019-06-20

## 2018-07-25 ENCOUNTER — OFFICE VISIT (OUTPATIENT)
Dept: URGENT CARE | Facility: URGENT CARE | Age: 63
End: 2018-07-25
Payer: COMMERCIAL

## 2018-07-25 VITALS
OXYGEN SATURATION: 98 % | TEMPERATURE: 98 F | HEART RATE: 67 BPM | DIASTOLIC BLOOD PRESSURE: 66 MMHG | BODY MASS INDEX: 24.91 KG/M2 | HEIGHT: 66 IN | WEIGHT: 155 LBS | SYSTOLIC BLOOD PRESSURE: 118 MMHG | RESPIRATION RATE: 16 BRPM

## 2018-07-25 DIAGNOSIS — R10.32 ABDOMINAL DISCOMFORT IN LEFT LOWER QUADRANT: Primary | ICD-10-CM

## 2018-07-25 LAB
ALBUMIN UR-MCNC: NEGATIVE MG/DL
APPEARANCE UR: CLEAR
BACTERIA #/AREA URNS HPF: ABNORMAL /HPF
BILIRUB UR QL STRIP: NEGATIVE
COLOR UR AUTO: YELLOW
GLUCOSE UR STRIP-MCNC: NEGATIVE MG/DL
HGB UR QL STRIP: NEGATIVE
KETONES UR STRIP-MCNC: NEGATIVE MG/DL
LEUKOCYTE ESTERASE UR QL STRIP: ABNORMAL
NITRATE UR QL: NEGATIVE
NON-SQ EPI CELLS #/AREA URNS LPF: ABNORMAL /LPF
PH UR STRIP: 5.5 PH (ref 5–7)
RBC #/AREA URNS AUTO: ABNORMAL /HPF
SOURCE: ABNORMAL
SP GR UR STRIP: <=1.005 (ref 1–1.03)
UROBILINOGEN UR STRIP-ACNC: 0.2 EU/DL (ref 0.2–1)
WBC #/AREA URNS AUTO: ABNORMAL /HPF

## 2018-07-25 PROCEDURE — 99214 OFFICE O/P EST MOD 30 MIN: CPT | Performed by: FAMILY MEDICINE

## 2018-07-25 PROCEDURE — 81001 URINALYSIS AUTO W/SCOPE: CPT | Performed by: FAMILY MEDICINE

## 2018-07-25 NOTE — PROGRESS NOTES
Subjective:   Isidra Tomas is a 62 year old female who presents for   Chief Complaint   Patient presents with     Abdominal Pain     left side pain, above hip area, comes and goes, can be painful x 130pm today     Had pain in left lower quadrant this afternoon. No urinary changes or new symptoms. No fevers. REgular bowel movements. Passing gas. Pain is gone at this very moment.   Not painful at this moment. Did try blueberries today first time in a long while and wondering if this may be the cause. No hx of abdominal surgeries.   PMHX/PSHX/MEDS/ALLERGIES/SHX/FHX reviewed in Epic.    Patient Active Problem List    Diagnosis Date Noted     Health Care Home 06/17/2013     Priority: Medium     State Tier Level:  Tier 1  Status:  n/a  Care Coordinator:    See Letters for [facility] Care Plan             ACP (advance care planning) 07/09/2012     Priority: Medium     Advance Care Planning 9/23/2015: ACP Review and Resources Provided:  Reviewed chart for advance care plan.  Isidra Tomas has no plan or code status on file. Discussed available resources and provided with information. Confirmed code status reflects current choices pending further ACP discussions.  Confirmed/documented legally designated decision maker(s). Added by ELEAZAR DONALD  Advance Care Planning 5/5/2017: ACP Review of Chart / Resources Provided:  Reviewed chart for advance care plan.  Isidra Tomas has no plan or code status on file. Discussed available resources and provided with information.   Added by Glendy Rahman                         Absence of menstruation 04/30/2009     Priority: Medium     Family history of other cardiovascular diseases 04/29/2005     Priority: Medium     Problem list name updated by automated process. Provider to review and confirm  Problem list name updated by automated process. Provider to review       Lumbago 04/01/2004     Priority: Medium     Hypothyroidism 03/28/2003     Priority: Medium     Problem list name  "updated by automated process. Provider to review         Current Outpatient Prescriptions   Medication     Cholecalciferol (VITAMIN D) 1000 UNIT capsule     fish oil-omega-3 fatty acids (FISH OIL) 1000 MG capsule     levothyroxine (SYNTHROID/LEVOTHROID) 112 MCG tablet     Multiple Vitamins-Calcium (ONE-A-DAY WOMENS FORMULA) TABS     No current facility-administered medications for this visit.          ROS:  As above per HPI    Objective:   /66 (BP Location: Right arm, Patient Position: Chair, Cuff Size: Adult Regular)  Pulse 67  Temp 98  F (36.7  C) (Oral)  Resp 16  Ht 5' 6\" (1.676 m)  Wt 155 lb (70.3 kg)  LMP 03/20/2007  SpO2 98%  BMI 25.02 kg/m2, Body mass index is 25.02 kg/(m^2).  Gen:  NAD, well-nourished, sitting in chair comfortably  HEENT: EOMI, PERRL sclera anicteric, Head normocephalic, ; nares patent; oropharynx pink and moist, wears corrective lenses  Neck: trachea midline  CV:  RRR  - no murmurs, rubs, or gallups,   Pulm:  CTAB, no wheezes/rales/rhonchi, no increased work of breathing   ABD: soft, non-distended, no pain with deep palpation in all quadrants  Extrem: no cyanosis, edema or clubbing  Skin: no obvious rashes or abnormalities         Results for orders placed or performed in visit on 07/25/18   UA reflex to Microscopic   Result Value Ref Range    Color Urine Yellow     Appearance Urine Clear     Glucose Urine Negative NEG^Negative mg/dL    Bilirubin Urine Negative NEG^Negative    Ketones Urine Negative NEG^Negative mg/dL    Specific Gravity Urine <=1.005 1.003 - 1.035    Blood Urine Negative NEG^Negative    pH Urine 5.5 5.0 - 7.0 pH    Protein Albumin Urine Negative NEG^Negative mg/dL    Urobilinogen Urine 0.2 0.2 - 1.0 EU/dL    Nitrite Urine Negative NEG^Negative    Leukocyte Esterase Urine Trace (A) NEG^Negative    Source Midstream Urine    Urine Microscopic   Result Value Ref Range    WBC Urine 0 - 5 OTO5^0 - 5 /HPF    RBC Urine O - 2 OTO2^O - 2 /HPF    Squamous Epithelial " /LPF Urine Few FEW^Few /LPF    Bacteria Urine Few (A) NEG^Negative /HPF       Assessment & Plan:   Isidra Tomas, 62 year old female who presents with:  Abdominal discomfort in left lower quadrant  Likely this is due to indigestion or buildup of gas from introducing different foods into the system. Reassuring as symptoms are not present at this time. Unlikely to be kidney stone based on presentation and absence of RBCs in urine. Follow-up as necessary, recommended trying OTC indigestion medications as a trial if symptoms return.   - UA reflex to Microscopic  - Urine Microscopic      Jah Louie MD    URGENT CARE [place]    Options for treatment and/or follow-up care were reviewed with the patient. Isidra Tomas and/or legal guardian was engaged and actively involved in the decision making process. Patient/guardian verbalized understanding of the options discussed and was satisfied with the final plan.

## 2018-07-25 NOTE — MR AVS SNAPSHOT
After Visit Summary   7/25/2018    Isidra Tomas    MRN: [id number]           Patient Information     Date Of Birth          1955        Visit Information        Provider Department      7/25/2018 5:05 PM Jah Louie MD [facility]        Today's Diagnoses     Abdominal discomfort in left lower quadrant    -  1      Care Instructions    Try pepto bismol or maalox to see if this can help with possible indigestion    Avoid foods that you ate today that you don't eat on a regularly basis as this can be a cause of increased gas buildup    Call or Return if symptoms worsen.              Follow-ups after your visit        Who to contact     If you have questions or need follow up information about today's clinic visit or your schedule please contact [facility] directly at [phone].  Normal or non-critical lab and imaging results will be communicated to you by MyChart, letter or phone within 4 business days after the clinic has received the results. If you do not hear from us within 7 days, please contact the clinic through [facility] or phone. If you have a critical or abnormal lab result, we will notify you by phone as soon as possible.  Submit refill requests through PANOSOL or call your pharmacy and they will forward the refill request to us. Please allow 3 business days for your refill to be completed.          Additional Information About Your Visit        [facility] Information     PANOSOL gives you secure access to your electronic health record. If you see a primary care provider, you can also send messages to your care team and make appointments. If you have questions, please call your primary care clinic.  If you do not have a primary care provider, please call [phone] and they will assist you.        Care EveryWhere ID     This is your Care EveryWhere ID. This could be used by other organizations to access your [facility]  "records  [id number]        Your Vitals Were     Pulse Temperature Respirations Height Last Period Pulse Oximetry    67 98  F (36.7  C) (Oral) 16 5' 6\" (1.676 m) 03/20/2007 98%    BMI (Body Mass Index)                   25.02 kg/m2            Blood Pressure from Last 3 Encounters:   07/25/18 118/66   06/07/18 128/82   11/16/17 128/88    Weight from Last 3 Encounters:   07/25/18 155 lb (70.3 kg)   06/07/18 155 lb 12.8 oz (70.7 kg)   11/16/17 155 lb 12.8 oz (70.7 kg)              We Performed the Following     UA reflex to Microscopic     Urine Microscopic        Primary Care Provider Office Phone # Fax #    Angelique Bajwa [phone] [address] [place] [id number]        Equal Access to Services     [facility]: Hadii aad ku hadasho Soomaali, waaxda luqadaha, qaybta kaalmada adeegyada, waxay idiin hayaan priya bustamante . So [facility] [phone].    ATENCIÓN: Si habla español, tiene a lozoya disposición servicios gratuitos de asistencia lingüística. Marcy al [phone].    We comply with applicable federal civil rights laws and [place] laws. We do not discriminate on the basis of race, color, national origin, age, disability, sex, sexual orientation, or gender identity.            Thank you!     Thank you for choosing [facility]  for your care. Our goal is always to provide you with excellent care. Hearing back from our patients is one way we can continue to improve our services. Please take a few minutes to complete the written survey that you may receive in the mail after your visit with us. Thank you!             Your Updated Medication List - Protect others around you: Learn how to safely use, store and throw away your medicines at www.disposemymeds.org.          This list is accurate as of 7/25/18  6:07 PM.  Always use your most recent med list.                   Brand Name Dispense Instructions for use Diagnosis    fish oil-omega-3 fatty " acids 1000 MG capsule      Take 2 g by mouth daily.        levothyroxine 112 MCG tablet    SYNTHROID/LEVOTHROID    90 tablet    TAKE 1 TABLET BY MOUTH  DAILY    Acquired hypothyroidism       ONE-A-DAY WOMENS FORMULA Tabs      Take 1 tablet by mouth daily.        vitamin D 1000 units capsule      Take 1 capsule by mouth daily.

## 2018-07-25 NOTE — PATIENT INSTRUCTIONS
Try pepto bismol or maalox to see if this can help with possible indigestion    Avoid foods that you ate today that you don't eat on a regularly basis as this can be a cause of increased gas buildup    Call or Return if symptoms worsen.

## 2018-09-05 ENCOUNTER — TRANSFERRED RECORDS (OUTPATIENT)
Dept: FAMILY MEDICINE | Facility: CLINIC | Age: 63
End: 2018-09-05

## 2018-10-16 ENCOUNTER — ALLIED HEALTH/NURSE VISIT (OUTPATIENT)
Dept: FAMILY MEDICINE | Facility: CLINIC | Age: 63
End: 2018-10-16

## 2018-10-16 DIAGNOSIS — Z23 NEED FOR VACCINATION: Primary | ICD-10-CM

## 2018-10-16 PROCEDURE — 90686 IIV4 VACC NO PRSV 0.5 ML IM: CPT | Performed by: FAMILY MEDICINE

## 2018-10-16 PROCEDURE — 90471 IMMUNIZATION ADMIN: CPT | Performed by: FAMILY MEDICINE

## 2019-03-08 DIAGNOSIS — E03.9 ACQUIRED HYPOTHYROIDISM: ICD-10-CM

## 2019-03-08 RX ORDER — LEVOTHYROXINE SODIUM 112 UG/1
TABLET ORAL
Qty: 90 TABLET | Refills: 3 | OUTPATIENT
Start: 2019-03-08

## 2019-03-08 NOTE — TELEPHONE ENCOUNTER
Refused Prescriptions:                       Disp   Refills    levothyroxine (SYNTHROID/LEVOTHROID) 112 M*90 tab*3        Sig: TAKE 1 TABLET BY MOUTH  DAILY  Refused By: DUYEN ROSE  Reason for Refusal: Request already responded to by other means (phone, fax, etc.)  Reason for Refusal Comment: refilled 6-8-2018 for one year    Duyen  [phone] (home)

## 2019-06-20 ENCOUNTER — OFFICE VISIT (OUTPATIENT)
Dept: FAMILY MEDICINE | Facility: CLINIC | Age: 64
End: 2019-06-20

## 2019-06-20 VITALS
RESPIRATION RATE: 22 BRPM | WEIGHT: 161 LBS | TEMPERATURE: 98.6 F | HEART RATE: 86 BPM | BODY MASS INDEX: 25.99 KG/M2 | SYSTOLIC BLOOD PRESSURE: 120 MMHG | OXYGEN SATURATION: 94 % | DIASTOLIC BLOOD PRESSURE: 82 MMHG

## 2019-06-20 DIAGNOSIS — J06.9 VIRAL URI WITH COUGH: ICD-10-CM

## 2019-06-20 DIAGNOSIS — E03.9 ACQUIRED HYPOTHYROIDISM: Primary | ICD-10-CM

## 2019-06-20 PROCEDURE — 99214 OFFICE O/P EST MOD 30 MIN: CPT | Performed by: FAMILY MEDICINE

## 2019-06-20 PROCEDURE — 36415 COLL VENOUS BLD VENIPUNCTURE: CPT | Performed by: FAMILY MEDICINE

## 2019-06-20 RX ORDER — LEVOTHYROXINE SODIUM 112 UG/1
TABLET ORAL
Qty: 90 TABLET | Refills: 3 | Status: SHIPPED | OUTPATIENT
Start: 2019-06-20 | End: 2020-08-06

## 2019-06-20 RX ORDER — BENZONATATE 200 MG/1
200 CAPSULE ORAL 3 TIMES DAILY PRN
Qty: 30 CAPSULE | Refills: 0 | Status: SHIPPED | OUTPATIENT
Start: 2019-06-20 | End: 2019-11-07

## 2019-06-20 NOTE — NURSING NOTE
Chief Complaint   Patient presents with     URI     cough, some congestion, sore throat, feels that the congestion is more in her throat then anywhere else, unable to sleep because of cough, no body aches or fevers but does have some fatigue     Recheck Medication     thyroid medication check      Pre-visit Screening:  Immunizations:  not up to date - due for tetanus today   Colonoscopy:  is up to date  Mammogram: is up to date  Asthma Action Test/Plan:  NA  PHQ9:  PHQ-2 done today   GAD7:  No Concerns  Questioned patient about current smoking habits Pt. has never smoked.  Ok to leave detailed message on voice mail for today's visit only Yes, phone # [phone]

## 2019-06-20 NOTE — PROGRESS NOTES
Subjective     Isidra Tomas is a 63 year old female who presents to clinic today for the following health issues:    HPI   Hypothyroidism Follow-up: diagnosed in       Since last visit, patient describes the following symptoms:  no hair loss, no skin changes, no constipation, no loose stools    Weight gain of six pounds in the last year.  Change in lifestyle-  is newly retired      Amount of exercise or physical activity: regular exercise encouraged    Problems taking medications regularly: No    Medication side effects: none    Diet: regular (no restrictions)      PROBLEMS TO ADD ON...  Five day history of upper respiratory symptoms:  Exposed to URI 48 hours before symptoms began  Sore glands  Cough- unable to sleep last night  Sore throat  No fever      Patient Active Problem List   Diagnosis     Hypothyroidism     Lumbago     Family history of other cardiovascular diseases     Absence of menstruation     ACP (advance care planning)     Health Care Home     Past Surgical History:   Procedure Laterality Date     COLONOSCOPY  2014    Normal/ repeat in 10 yrs     HC COLONOSCOPY THRU STOMA, DIAGNOSTIC  2005    Normal/ [place] [facility]: repeat in 10 yrs       Social History     Tobacco Use     Smoking status: Never Smoker     Smokeless tobacco: Never Used   Substance Use Topics     Alcohol use: Yes     Alcohol/week: 1.0 oz     Types: 2 Standard drinks or equivalent per week     Comment: occasional, 2 oz per week     Family History   Problem Relation Age of Onset     Thyroid Disease Maternal Grandmother      Heart Disease Father          at age 47     Lipids Sister         two sisters with heart disease     Heart Disease Mother         valvular disease     Eye Disorder Mother         macular degeneration     C.A.D. Brother          69 yo, [place]         Current Outpatient Medications   Medication Sig Dispense Refill     benzonatate (TESSALON) 200 MG capsule Take 1 capsule (200 mg) by mouth 3 times daily  as needed for cough 30 capsule 0     Cholecalciferol (VITAMIN D) 1000 UNIT capsule Take 1 capsule by mouth daily.       fish oil-omega-3 fatty acids (FISH OIL) 1000 MG capsule Take 2 g by mouth daily.       levothyroxine (SYNTHROID/LEVOTHROID) 112 MCG tablet TAKE 1 TABLET BY MOUTH  DAILY 90 tablet 3     Multiple Vitamins-Calcium (ONE-A-DAY WOMENS FORMULA) TABS Take 1 tablet by mouth daily.        Reviewed and updated as needed this visit by Provider            Review of Systems   ROS COMP: Constitutional, HEENT, cardiovascular, pulmonary, GI, , musculoskeletal, neuro, skin, endocrine and psych systems are negative, except as otherwise noted.      Objective    /82 (BP Location: Left arm, Patient Position: Sitting, Cuff Size: Adult Regular)   Pulse 86   Temp 98.6  F (37  C) (Oral)   Resp 22   Wt 73 kg (161 lb)   LMP 03/20/2007   SpO2 94%   BMI 25.99 kg/m    Body mass index is 25.99 kg/m .  Physical Exam   GENERAL: healthy, alert and no distress  External ears  and canals clear bilaterally. TM's normal bilaterally. Nose normal without lesions or discharge. Oropharynx: erythema of the posterior pharynx.   NECK: no adenopathy, no asymmetry, masses, or scars and thyroid normal to palpation  RESP: lungs clear to auscultation - no rales, rhonchi or wheezes  CV: regular rate and rhythm, normal S1 S2, no S3 or S4, no murmur, click or rub, no peripheral edema and peripheral pulses strong  MS: no gross musculoskeletal defects noted, no edema    Diagnostic Test Results:  No results found for this or any previous visit (from the past 24 hour(s)).        Assessment & Plan    (E03.9) Acquired hypothyroidism  (primary encounter diagnosis)  Comment: requests written RX- results are pending  Plan: TSH ([facility]), T4 free, VENOUS COLLECTION,         levothyroxine (SYNTHROID/LEVOTHROID) 112 MCG         tablet,            (J06.9,  B97.89) Viral URI with cough  Comment:   Plan:  benzonatate (TESSALON) 200 MG capsule  Ibuprofen  or tylenol  Throat lozenges  Call back on Monday if not better    Recheck thyroid in one year  Angelique Bajwa MD  [facility]

## 2019-06-21 LAB
T4, FREE, NON-DIALYSIS - QUEST: 1.3 NG/DL (ref 0.8–1.8)
TSH SERPL-ACNC: 2.88 MIU/L (ref 0.4–4.5)

## 2019-06-23 ENCOUNTER — OFFICE VISIT (OUTPATIENT)
Dept: URGENT CARE | Facility: URGENT CARE | Age: 64
End: 2019-06-23
Payer: COMMERCIAL

## 2019-06-23 VITALS
TEMPERATURE: 97.8 F | HEART RATE: 83 BPM | DIASTOLIC BLOOD PRESSURE: 82 MMHG | SYSTOLIC BLOOD PRESSURE: 109 MMHG | OXYGEN SATURATION: 95 %

## 2019-06-23 DIAGNOSIS — R07.0 THROAT PAIN: Primary | ICD-10-CM

## 2019-06-23 LAB
DEPRECATED S PYO AG THROAT QL EIA: NORMAL
SPECIMEN SOURCE: NORMAL

## 2019-06-23 PROCEDURE — 99213 OFFICE O/P EST LOW 20 MIN: CPT | Performed by: FAMILY MEDICINE

## 2019-06-23 PROCEDURE — 87880 STREP A ASSAY W/OPTIC: CPT | Performed by: FAMILY MEDICINE

## 2019-06-23 PROCEDURE — 87081 CULTURE SCREEN ONLY: CPT | Performed by: FAMILY MEDICINE

## 2019-06-23 RX ORDER — DIPHENHYDRAMINE HYDROCHLORIDE AND LIDOCAINE HYDROCHLORIDE AND ALUMINUM HYDROXIDE AND MAGNESIUM HYDRO
5-10 KIT EVERY 6 HOURS PRN
Qty: 1 BOTTLE | Refills: 0 | Status: SHIPPED | OUTPATIENT
Start: 2019-06-23 | End: 2019-11-07

## 2019-06-23 RX ORDER — CEFDINIR 300 MG/1
300 CAPSULE ORAL 2 TIMES DAILY
Qty: 20 CAPSULE | Refills: 0 | Status: SHIPPED | OUTPATIENT
Start: 2019-06-23 | End: 2019-11-07

## 2019-06-23 RX ORDER — CODEINE PHOSPHATE AND GUAIFENESIN 10; 100 MG/5ML; MG/5ML
1-2 SOLUTION ORAL EVERY 4 HOURS PRN
Qty: 120 ML | Refills: 0 | Status: SHIPPED | OUTPATIENT
Start: 2019-06-23 | End: 2019-11-07

## 2019-06-23 NOTE — PROGRESS NOTES
SUBJECTIVE: Isidra Tomas is a 63 year old female patient complaining of sinus congestion for 10 day(s).     OBJECTIVE: The patient appears moderate distress.   EARS: positive findings: RT side erythematous  NOSE/SINUS: positive findings: mucosa erythematous and swollen  Sinus palpation: Maxillary sinus tender to palpation   THROAT: moderate erythema   NECK:positive findings: moderate anterior cervical nodes   CHEST: Clear    ASSESSMENT: Acute Sinusitis  OM    PLAN: See orders.   In addition, I have suggested that the patient   psuh fluids.

## 2019-06-24 LAB
BACTERIA SPEC CULT: NORMAL
SPECIMEN SOURCE: NORMAL

## 2019-06-26 ENCOUNTER — TRANSFERRED RECORDS (OUTPATIENT)
Dept: FAMILY MEDICINE | Facility: CLINIC | Age: 64
End: 2019-06-26

## 2019-06-27 ENCOUNTER — TELEPHONE (OUTPATIENT)
Dept: FAMILY MEDICINE | Facility: CLINIC | Age: 64
End: 2019-06-27

## 2019-06-27 NOTE — TELEPHONE ENCOUNTER
Continue antibiotic-  Add decongestant to her current medications (sudafed)    I can see her tomorrow if she prefers  Please call

## 2019-06-27 NOTE — TELEPHONE ENCOUNTER
Spoke with pt. Advised her to continue the antibiotic and add in a decongestant. Pt understood and had no further questions. She stated she would like to finish the antibiotics and will call if not getting better from there.

## 2019-06-27 NOTE — TELEPHONE ENCOUNTER
Pt called in to update you on how her viral URI is doing. She stated she was in urgent care on Sunday. The cough is still ongoing and her ears feel very plugged. Pt is wondering what else she should be doing as this is not improving.    Please advise # [phone]    Thanks, Chanda

## 2019-07-08 ENCOUNTER — TRANSFERRED RECORDS (OUTPATIENT)
Dept: FAMILY MEDICINE | Facility: CLINIC | Age: 64
End: 2019-07-08

## 2019-07-11 PROBLEM — C43.61 MALIGNANT MELANOMA OF SKIN OF UPPER LIMB, INCLUDING SHOULDER, RIGHT (H): Status: ACTIVE | Noted: 2019-07-11

## 2019-07-23 ENCOUNTER — TRANSFERRED RECORDS (OUTPATIENT)
Dept: FAMILY MEDICINE | Facility: CLINIC | Age: 64
End: 2019-07-23

## 2019-09-12 ENCOUNTER — TRANSFERRED RECORDS (OUTPATIENT)
Dept: FAMILY MEDICINE | Facility: CLINIC | Age: 64
End: 2019-09-12

## 2019-09-12 LAB — MAMMOGRAM: NORMAL

## 2019-10-09 ENCOUNTER — TRANSFERRED RECORDS (OUTPATIENT)
Dept: FAMILY MEDICINE | Facility: CLINIC | Age: 64
End: 2019-10-09

## 2019-10-15 ENCOUNTER — ALLIED HEALTH/NURSE VISIT (OUTPATIENT)
Dept: FAMILY MEDICINE | Facility: CLINIC | Age: 64
End: 2019-10-15

## 2019-10-15 DIAGNOSIS — Z23 NEED FOR VACCINATION: Primary | ICD-10-CM

## 2019-10-15 PROCEDURE — 90686 IIV4 VACC NO PRSV 0.5 ML IM: CPT | Performed by: FAMILY MEDICINE

## 2019-10-15 PROCEDURE — G0008 ADMIN INFLUENZA VIRUS VAC: HCPCS | Performed by: FAMILY MEDICINE

## 2019-11-07 ENCOUNTER — OFFICE VISIT (OUTPATIENT)
Dept: FAMILY MEDICINE | Facility: CLINIC | Age: 64
End: 2019-11-07

## 2019-11-07 VITALS
WEIGHT: 163 LBS | DIASTOLIC BLOOD PRESSURE: 72 MMHG | BODY MASS INDEX: 26.31 KG/M2 | TEMPERATURE: 97.8 F | OXYGEN SATURATION: 99 % | SYSTOLIC BLOOD PRESSURE: 118 MMHG | HEART RATE: 61 BPM

## 2019-11-07 DIAGNOSIS — Z23 NEED FOR VACCINATION: ICD-10-CM

## 2019-11-07 DIAGNOSIS — H69.91 DYSFUNCTION OF RIGHT EUSTACHIAN TUBE: Primary | ICD-10-CM

## 2019-11-07 PROCEDURE — 92567 TYMPANOMETRY: CPT | Performed by: FAMILY MEDICINE

## 2019-11-07 PROCEDURE — 99213 OFFICE O/P EST LOW 20 MIN: CPT | Mod: 25 | Performed by: FAMILY MEDICINE

## 2019-11-07 PROCEDURE — G0009 ADMIN PNEUMOCOCCAL VACCINE: HCPCS | Performed by: FAMILY MEDICINE

## 2019-11-07 PROCEDURE — 90670 PCV13 VACCINE IM: CPT | Performed by: FAMILY MEDICINE

## 2019-11-07 RX ORDER — FLUTICASONE PROPIONATE 50 MCG
2 SPRAY, SUSPENSION (ML) NASAL DAILY
Qty: 16 G | Refills: 0 | Status: SHIPPED | OUTPATIENT
Start: 2019-11-07 | End: 2020-08-06

## 2019-11-07 NOTE — LETTER
[facility]                 [facility]  [address]  [place]  [address]        For Emergencies:  Call 911      For Clinic Appointments:   [phone]                       Call me in thirty days if ear symptoms did not resolve      [facility]   [address] [place] [address]  [address]   [facility] [address]   [phone] - appt line   [phone] -  fax #

## 2019-11-07 NOTE — NURSING NOTE
Chief Complaint   Patient presents with     Ear Problem     right ear is feeling plugged     Pre-visit Screening:  Immunizations:  not up to date   Colonoscopy:  is up to date  Mammogram: is up to date  Asthma Action Test/Plan:  NA  PHQ9:  NA  GAD7:  NA  Questioned patient about current smoking habits Pt. has never smoked.  Ok to leave detailed message on voice mail for today's visit only yes, phone # [phone]

## 2019-11-07 NOTE — PROGRESS NOTES
"SUBJECTIVE:  64 year old female presents with the following concern:    Both ears felt plugged in June  She was treated for a sinus infection- left with residual symptoms- every morning her right ear still feels plugged   she denies sinus- frontal pain  She feels \"nasally\"  She denies tinnitus  She denies hearing loss      Plugging is not a constant symptom    Patient Active Problem List   Diagnosis     Hypothyroidism     Lumbago     Family history of other cardiovascular diseases     Absence of menstruation     ACP (advance care planning)     Health Care Home     Malignant melanoma of skin of upper limb, including shoulder, right (H)     Past Surgical History:   Procedure Laterality Date     COLONOSCOPY  7/31/2014    Normal/ repeat in 10 yrs     HC COLONOSCOPY THRU STOMA, DIAGNOSTIC  6/2005    [place] [facility]: repeat in 10 yrs     Current Outpatient Medications   Medication     Cholecalciferol (VITAMIN D) 1000 UNIT capsule     fluticasone (FLONASE) 50 MCG/ACT nasal spray     levothyroxine (SYNTHROID/LEVOTHROID) 112 MCG tablet     Multiple Vitamins-Calcium (ONE-A-DAY WOMENS FORMULA) TABS     No current facility-administered medications for this visit.       ROS: 7 point ROS neg other than the symptoms noted above in the HPI.          OBJECTIVE:  /72 (BP Location: Left arm, Patient Position: Sitting, Cuff Size: Adult Regular)   Pulse 61   Temp 97.8  F (36.6  C) (Oral)   Wt 73.9 kg (163 lb)   LMP 03/20/2007   SpO2 99%   BMI 26.31 kg/m     External ears  and canals clear bilaterally. TM's normal bilaterally. Nose normal without lesions or discharge. Oropharynx normal. Neck supple without palpable adenopathy.    Normal tympanogram-     Assessment    (H69.81) Dysfunction of right eustachian tube  (primary encounter diagnosis)  Comment:  If flonase dose not resolve symptoms, ENT consult advised  Plan: TYMPANOMETRY, fluticasone (FLONASE) 50 MCG/ACT         nasal spray            (Z23) Need for " vaccination  Comment:   Plan: PNEUMOCOCCAL CONJ VACCINE 13 VALENT IM

## 2019-11-29 DIAGNOSIS — H69.91 DYSFUNCTION OF RIGHT EUSTACHIAN TUBE: ICD-10-CM

## 2019-12-02 RX ORDER — FLUTICASONE PROPIONATE 50 MCG
SPRAY, SUSPENSION (ML) NASAL
Qty: 16 ML | Refills: 0 | OUTPATIENT
Start: 2019-12-02

## 2020-01-15 ENCOUNTER — TRANSFERRED RECORDS (OUTPATIENT)
Dept: FAMILY MEDICINE | Facility: CLINIC | Age: 65
End: 2020-01-15

## 2020-01-17 ENCOUNTER — TRANSFERRED RECORDS (OUTPATIENT)
Dept: FAMILY MEDICINE | Facility: CLINIC | Age: 65
End: 2020-01-17

## 2020-02-12 ENCOUNTER — OFFICE VISIT (OUTPATIENT)
Dept: FAMILY MEDICINE | Facility: CLINIC | Age: 65
End: 2020-02-12

## 2020-02-12 VITALS
TEMPERATURE: 97.3 F | SYSTOLIC BLOOD PRESSURE: 130 MMHG | WEIGHT: 162 LBS | RESPIRATION RATE: 20 BRPM | DIASTOLIC BLOOD PRESSURE: 82 MMHG | HEART RATE: 64 BPM | HEIGHT: 65 IN | BODY MASS INDEX: 26.99 KG/M2

## 2020-02-12 DIAGNOSIS — R10.9 LEFT SIDED ABDOMINAL PAIN: Primary | ICD-10-CM

## 2020-02-12 PROCEDURE — 99213 OFFICE O/P EST LOW 20 MIN: CPT | Performed by: PHYSICIAN ASSISTANT

## 2020-02-12 PROCEDURE — 74018 RADEX ABDOMEN 1 VIEW: CPT | Performed by: PHYSICIAN ASSISTANT

## 2020-02-12 ASSESSMENT — MIFFLIN-ST. JEOR: SCORE: 1280.71

## 2020-02-12 NOTE — NURSING NOTE
Isidra WOODSON Aubreedevorah is here for left side abdominal discomfort for the past few days.    Questioned patient about current smoking habits.  Pt. has never smoked.  PULSE regular  My Chart: active  CLASSIFICATION OF OVERWEIGHT AND OBESITY BY BMI                        Obesity Class           BMI(kg/m2)  Underweight                                    < 18.5  Normal                                         18.5-24.9  Overweight                                     25.0-29.9  OBESITY                     I                  30.0-34.9                             II                 35.0-39.9  EXTREME OBESITY             III                >40                            Patient's  BMI Body mass index is 26.96 kg/m .  http://hin.nhlbi.nih.gov/menuplanner/menu.cgi  Pre-visit planning  Immunizations - up to date  Colonoscopy - is up to date  Mammogram - is up to date  Asthma -   PHQ9 -    JEET-7 -

## 2020-02-12 NOTE — PROGRESS NOTES
"SUBJECTIVE:                                                    Isidra Tomas is a 64 year old female who presents to clinic today for the following health issues:    Chief Complaint   Patient presents with     Abdominal Pain     left side       Pain intermittent in the LLQ that started a week ago. She stated that it starts in the LLQ and the will move to LUQ.  She states the ache comes and goes, pain is 2/10 at the worst. She just \"wants to know what it is\".  She did eat a lot of Sunflowers last Thursday, a day before the pain developed. Denies fevers, change in BM. Does have hx of intermittent diarrhea/constipation. Denies melena or BRBPR.  She has bilateral ovaries intact.       Colonoscopy UTD    ROS:  Constitutional: NEGATIVE for fever, chills, change in weight  Eyes: NEGATIVE for vision changes or irritation  Ears: right eustachian tube dysfunction - seeing ENT  Nose: NEGATIVE for rhinorrhea, epistaxis or congestion  Mouth: NEGATIVE for ulcerations, sore throat.   R: NEGATIVE for significant cough or SOB  CV: NEGATIVE for chest pain, palpitations or peripheral edema  : NEGATIVE for frequency, dysuria, or hematuria          BP Readings from Last 3 Encounters:   02/12/20 130/82   11/07/19 118/72   06/23/19 109/82    Wt Readings from Last 3 Encounters:   02/12/20 73.5 kg (162 lb)   11/07/19 73.9 kg (163 lb)   06/20/19 73 kg (161 lb)            Patient Active Problem List   Diagnosis     Hypothyroidism     Lumbago     Family history of other cardiovascular diseases     Absence of menstruation     ACP (advance care planning)     Health Care Home     Malignant melanoma of skin of upper limb, including shoulder, right (H)     Past Surgical History:   Procedure Laterality Date     COLONOSCOPY  7/31/2014    Normal/ repeat in 10 yrs     HC COLONOSCOPY THRU STOMA, DIAGNOSTIC  6/2005    [place] [facility] repeat in 10 yrs       Social History     Tobacco Use     Smoking status: Never Smoker     Smokeless tobacco: Never " "Used   Substance Use Topics     Alcohol use: Yes     Alcohol/week: 1.7 standard drinks     Types: 2 Standard drinks or equivalent per week     Comment: occasional, 2 oz per week     Family History   Problem Relation Age of Onset     Thyroid Disease Maternal Grandmother      Heart Disease Father          at age 47     Lipids Sister         two sisters with heart disease     Heart Disease Mother         valvular disease     Eye Disorder Mother         macular degeneration     C.A.D. Brother          69 yo, [place]         Current Outpatient Medications   Medication Sig Dispense Refill     Cholecalciferol (VITAMIN D) 1000 UNIT capsule Take 1 capsule by mouth daily.       fluticasone (FLONASE) 50 MCG/ACT nasal spray Spray 2 sprays into both nostrils daily 16 g 0     levothyroxine (SYNTHROID/LEVOTHROID) 112 MCG tablet TAKE 1 TABLET BY MOUTH  DAILY 90 tablet 3     Multiple Vitamins-Calcium (ONE-A-DAY WOMENS FORMULA) TABS Take 1 tablet by mouth daily.         Allergies   Allergen Reactions     Erythromycin      does not tolerate well       OBJECTIVE:                                                    /82 (BP Location: Right arm, Patient Position: Chair, Cuff Size: Adult Regular)   Pulse 64   Temp 97.3  F (36.3  C) (Oral)   Resp 20   Ht 1.651 m (5' 5\")   Wt 73.5 kg (162 lb)   LMP 2007   BMI 26.96 kg/m   Body mass index is 26.96 kg/m .     GENERAL: alert and no distress  HEAD: Normocephalic, atraumatic  EYES: Eyes grossly normal to inspection, extraocular movements - intact  EARS:   Right: External ear and canal normal, TM normal  Left: External ear and canal normal, TM normal  NOSE: No discharge noted  MOUTH/THROAT: no ulcers, no lesions, pharynx non-erythematous, no exudates present, tonsils without hypertrophy, mucous membranes moist.   NECK: no tenderness, no adenopathy, no asymmetry, no masses, no stiffness; thyroid- normal to palpation  RESP: lungs clear to auscultation - no crackles, no rhonchi, " no wheezes  CV: regular rates and rhythm, normal S1 S2, no S3 or S4 and no murmur, no click or rub -  Abdomen: Normal bowel sounds. Soft, no masses, or organomegaly. Non-tender. No guarding, no rebound tenderness.     Xray: moderate stool in area of concern.            ASSESSMENT/PLAN:                                                      1. Left sided abdominal pain  Suspect due to stool/constipation  - XR Abdomen 1 View    Milk of Magnesia - follow instructions on bottle - take max dose - up to 3 days in a row.  Inc water - 60 oz daily - do this ongoing  Fiber in diet - 25-35 g fiber daily  Handout provided    See me in clinic if pain worsening in the next week, ED if we are closed  DD including diverticulitis discussed.   Will refer for colonoscopy if not improving with diet modifications          G[facility]MA [facility]

## 2020-02-19 ENCOUNTER — MYC MEDICAL ADVICE (OUTPATIENT)
Dept: FAMILY MEDICINE | Facility: CLINIC | Age: 65
End: 2020-02-19

## 2020-02-21 ENCOUNTER — OFFICE VISIT (OUTPATIENT)
Dept: FAMILY MEDICINE | Facility: CLINIC | Age: 65
End: 2020-02-21

## 2020-02-21 VITALS
OXYGEN SATURATION: 97 % | RESPIRATION RATE: 20 BRPM | WEIGHT: 163 LBS | SYSTOLIC BLOOD PRESSURE: 132 MMHG | BODY MASS INDEX: 27.12 KG/M2 | DIASTOLIC BLOOD PRESSURE: 82 MMHG | HEART RATE: 68 BPM

## 2020-02-21 DIAGNOSIS — R10.32 ABDOMINAL PAIN, LEFT LOWER QUADRANT: Primary | ICD-10-CM

## 2020-02-21 LAB
% GRANULOCYTES: 58.5 %
ALBUMIN (URINE): ABNORMAL MG/DL
APPEARANCE UR: CLEAR
BACTERIA, UR: ABNORMAL
BILIRUB UR QL: ABNORMAL
CASTS/LPF: ABNORMAL
COLOR UR: YELLOW
EP/HPF: ABNORMAL
GLUCOSE URINE: ABNORMAL MG/DL
HCT VFR BLD AUTO: 46.8 % (ref 35–47)
HEMOGLOBIN: 15.3 G/DL (ref 11.7–15.7)
HGB UR QL: ABNORMAL
KETONES UR QL: ABNORMAL MG/DL
LEUKOCYTE ESTERASE - QUEST: ABNORMAL
LYMPHOCYTES NFR BLD AUTO: 30.2 %
MCH RBC QN AUTO: 30.7 PG (ref 26–33)
MCHC RBC AUTO-ENTMCNC: 32.7 G/DL (ref 31–36)
MCV RBC AUTO: 93.8 FL (ref 78–100)
MISC.: ABNORMAL
MONOCYTES NFR BLD AUTO: 11.3 %
NITRITE UR QL STRIP: ABNORMAL
PH UR STRIP: 5 PH (ref 5–7)
PLATELET COUNT - QUEST: 229 10^9/L (ref 150–375)
RBC # BLD AUTO: 4.99 10*12/L (ref 3.8–5.2)
RBC, UR MICRO: ABNORMAL (ref ?–2)
SP. GRAVITY: 1.02
UROBILINOGEN UR QL STRIP: 0.2 EU/DL (ref 0.2–1)
WBC # BLD AUTO: 8.9 10*9/L (ref 4–11)
WBC, UR MICRO: ABNORMAL (ref ?–2)

## 2020-02-21 PROCEDURE — 99214 OFFICE O/P EST MOD 30 MIN: CPT | Performed by: FAMILY MEDICINE

## 2020-02-21 PROCEDURE — 85025 COMPLETE CBC W/AUTO DIFF WBC: CPT | Performed by: FAMILY MEDICINE

## 2020-02-21 PROCEDURE — 81001 URINALYSIS AUTO W/SCOPE: CPT | Performed by: FAMILY MEDICINE

## 2020-02-21 PROCEDURE — 36415 COLL VENOUS BLD VENIPUNCTURE: CPT | Performed by: FAMILY MEDICINE

## 2020-02-21 NOTE — PROGRESS NOTES
SUBJECTIVE:  64 year old female presents for recheck of her left sided abdominal pain.     HPI   Abdominal Pain      Duration: 3-4 weeks    Description (location/character/radiation): left sided: starts in her LLQ but can move to LUQ    Pain is intermittent    Quality: initially felt like a pinching, now more like an aching.       Associated flank pain: None    Intensity:  Mild to moderate: can interrupt sleep    Accompanying signs and symptoms:        Fever/Chills: no        Gas/Bloating: no        Nausea/vomitting: no        Diarrhea: no        Dysuria or Hematuria: no   Weight loss: no    History (previous similar pain/trauma/previous testing): initially seen by Sun Beck on 2/12/2020    Normal flat plate of abdomen: trial of MOM - no change in symptoms        Precipitating or alleviating factors:       Pain worse with eating/BM/urination: no       Pain relieved by BM: no     Therapies tried and outcome: laxatives: no change    LMP:  not applicable    PMH significant for diagnosis of malignant melanoma- both arms last year  She admits to cancer anxiety regarding her abdominal symptoms-       Patient Active Problem List   Diagnosis     Hypothyroidism     Lumbago     Family history of other cardiovascular diseases     Absence of menstruation     ACP (advance care planning)     Health Care Home     Malignant melanoma of skin of upper limb, including shoulder, right (H)     Past Surgical History:   Procedure Laterality Date     COLONOSCOPY  7/31/2014    Normal/ repeat in 10 yrs     HC COLONOSCOPY THRU STOMA, DIAGNOSTIC  6/2005    Normal/ [place] [facility]: repeat in 10 yrs       Social History     Tobacco Use     Smoking status: Never Smoker     Smokeless tobacco: Never Used   Substance Use Topics     Alcohol use: Yes     Alcohol/week: 1.7 standard drinks     Types: 2 Standard drinks or equivalent per week     Comment: occasional, 2 oz per week     Family History   Problem Relation Age of Onset     Thyroid Disease  Maternal Grandmother      Heart Disease Father          at age 47     Lipids Sister         two sisters with heart disease     Heart Disease Mother         valvular disease     Eye Disorder Mother         macular degeneration     C.A.D. Brother          71 yo, [place]         Current Outpatient Medications   Medication Sig Dispense Refill     Cholecalciferol (VITAMIN D) 1000 UNIT capsule Take 1 capsule by mouth daily.       fluticasone (FLONASE) 50 MCG/ACT nasal spray Spray 2 sprays into both nostrils daily 16 g 0     levothyroxine (SYNTHROID/LEVOTHROID) 112 MCG tablet TAKE 1 TABLET BY MOUTH  DAILY 90 tablet 3     Multiple Vitamins-Calcium (ONE-A-DAY WOMENS FORMULA) TABS Take 1 tablet by mouth daily.            Reviewed and updated as needed this visit by Provider         Review of Systems    ROS: 10 point ROS neg other than the symptoms noted above in the HPI.      Objective    /82 (BP Location: Right arm, Patient Position: Sitting, Cuff Size: Adult Regular)   Pulse 68   Resp 20   Wt 73.9 kg (163 lb)   LMP 2007   SpO2 97%   BMI 27.12 kg/m    Body mass index is 27.12 kg/m .  Physical Exam   No acute distress  Regular rate and  rhythm. S1 and S2 normal, no murmurs, clicks, gallops or rubs. No edema or JVD. Chest is clear; no wheezes or rales.  The abdomen is soft without tenderness, guarding, mass or organomegaly. Bowel sounds are normal. No CVA tenderness        Diagnostic Test Results:  Labs reviewed in Epic  Results for orders placed or performed in visit on 20 (from the past 24 hour(s))   HCL  Urinalysis, Routine (BFP)   Result Value Ref Range    Color Urine Yellow     Appearance Urine Clear     Glucose Urine Neg neg mg/dL    Bilirubin Urine Neg neg    Ketones Urine Other (A) neg mg/dL    Specific Gravity 1.025     Blood Urine Trace (A) neg    pH Urine 5.0 5.0 - 7.0 pH    Albumin Urine neg neg - neg mg/dL    Urobilinogen Urine 0.2 0.2 - 1.0 EU/dL    Nitrite Urine Neg NEG    Leukocyte  Esterase neg neg - neg    Wbc, Urine Micro neg neg - 2    RBC Micro Urine 0-1 neg - 2    EP/HPF few     Bacteria Urine neg neg - neg    Casts/LPF neg     Miscellaneous neg    CL AFF HEMOGRAM/PLATE/DIFF (BFP)   Result Value Ref Range    WBC 8.9 4.0 - 11 10*9/L    RBC Count 4.99 3.8 - 5.2 10*12/L    Hemoglobin 15.3 11.7 - 15.7 g/dL    Hematocrit 46.8 35.0 - 47.0 %    MCV 93.8 78 - 100 fL    MCH 30.7 26 - 33 pg    MCHC 32.7 31 - 36 g/dL    Platelet Count 229 150 - 375 10^9/L    % Granulocytes 58.5 %    % Lymphocytes 30.2 %    % Monocytes 11.3 %           Assessment & Plan   Problem List Items Addressed This Visit     None      Visit Diagnoses     Abdominal pain, left lower quadrant    -  Primary    Relevant Orders    HCL  Urinalysis, Routine (BFP) (Completed)    CL AFF HEMOGRAM/PLATE/DIFF (BFP) (Completed)    VENOUS COLLECTION (Completed)    CT Abdomen Pelvis w/o & w Contrast                Follow up pending imaging results  No follow-ups on file.    Angelique Bajwa MD  [facility]      t

## 2020-02-21 NOTE — NURSING NOTE
Telephone call made to schedule Isidra Tomas for the following: CT of abdomen pelvis     Date: 2/27/2020   Time: 8:30 am with check in at 8:15 am  Place: [facility]

## 2020-02-21 NOTE — NURSING NOTE
Chief Complaint   Patient presents with     Gastric Problem     still having stomach pain in left lower area, more of an ache then a pain, starts to spread a little, has not gone away after first treatment     Pre-visit Screening:  Immunizations:  up to date  Colonoscopy:  is up to date  Mammogram: is up to date  Asthma Action Test/Plan:  NA  PHQ9:  NA  GAD7:  NA  Questioned patient about current smoking habits Pt. has never smoked.  Ok to leave detailed message on voice mail for today's visit only Yes, phone # [phone]

## 2020-02-25 ENCOUNTER — TELEPHONE (OUTPATIENT)
Dept: FAMILY MEDICINE | Facility: CLINIC | Age: 65
End: 2020-02-25

## 2020-02-25 NOTE — TELEPHONE ENCOUNTER
Dr. Garett Aldana with [facility] called RE the CT Abdomen Pelvis w/o and w contrast.     States that patients insurance, [facility] is requiring a Peer to Peer before they will approve/deny the CT Abdomen Pelvis w/o and w contrast.     Katya gave me the Case Reference #: [id number]. We will need to call [employer] @ [phone] for the Peer to Peer.      Patient is scheduled for 2/27/2020 @ 8:15am for the CT Abdomen Pelvis w/o and w contrast.     I did call [employer] to make sure they have the 2 office notes ( 2/12/2020 & 2/21/2020 ) and the x-ray report. Kaleigh ARMENDARIZ with [employer] could not see the attachment that was attached to Case Ref #: [id number].   I faxed the 2 office notes and x-ray report to [phone]    Please let me know when I can call [phone] to start the Peer to Peer.     Thank you

## 2020-02-26 DIAGNOSIS — R10.32 ABDOMINAL PAIN, LEFT LOWER QUADRANT: Primary | ICD-10-CM

## 2020-02-26 NOTE — TELEPHONE ENCOUNTER
I called Ros[facility] for the Peer to Peer. I was informed that the Peer to Peer has to be scheduled.     Dr. Toro Prasad with [employer] will be calling Dr. Bajwa this morning ( 2/26/2020 ) @ 9:30am.  will be calling [phone]    Case Ref #: [id number]    [facility]

## 2020-02-27 NOTE — TELEPHONE ENCOUNTER
I called [employer] ( [phone] ) talked with Shayy IVAN. The  CT Abdomen Pelvis with contrast procedure code 70982 has been approved.     Reference #: [id number]    I called [facility] ( [phone] ) talked with Stefany. Gave her the Reference #: [id number] for the CT Abdomen Pelvis with contrast procedure code 38555. [facility] will call patient to make her appt

## 2020-03-15 ENCOUNTER — HEALTH MAINTENANCE LETTER (OUTPATIENT)
Age: 65
End: 2020-03-15

## 2020-04-16 ENCOUNTER — TRANSFERRED RECORDS (OUTPATIENT)
Dept: FAMILY MEDICINE | Facility: CLINIC | Age: 65
End: 2020-04-16

## 2020-08-06 ENCOUNTER — OFFICE VISIT (OUTPATIENT)
Dept: FAMILY MEDICINE | Facility: CLINIC | Age: 65
End: 2020-08-06

## 2020-08-06 VITALS
HEART RATE: 69 BPM | TEMPERATURE: 98.1 F | BODY MASS INDEX: 27.89 KG/M2 | SYSTOLIC BLOOD PRESSURE: 120 MMHG | OXYGEN SATURATION: 98 % | DIASTOLIC BLOOD PRESSURE: 88 MMHG | WEIGHT: 167.6 LBS

## 2020-08-06 DIAGNOSIS — E66.3 OVERWEIGHT: ICD-10-CM

## 2020-08-06 DIAGNOSIS — E78.00 PURE HYPERCHOLESTEROLEMIA: ICD-10-CM

## 2020-08-06 DIAGNOSIS — F51.01 PRIMARY INSOMNIA: ICD-10-CM

## 2020-08-06 DIAGNOSIS — E03.9 ACQUIRED HYPOTHYROIDISM: Primary | ICD-10-CM

## 2020-08-06 LAB
CHOLEST SERPL-MCNC: 263 MG/DL (ref 0–199)
CHOLEST/HDLC SERPL: 3 {RATIO} (ref 0–5)
HDLC SERPL-MCNC: 104 MG/DL (ref 40–150)
LDLC SERPL CALC-MCNC: 145 MG/DL (ref 0–130)
TRIGL SERPL-MCNC: 72 MG/DL (ref 0–149)

## 2020-08-06 PROCEDURE — 36415 COLL VENOUS BLD VENIPUNCTURE: CPT | Performed by: PHYSICIAN ASSISTANT

## 2020-08-06 PROCEDURE — 99213 OFFICE O/P EST LOW 20 MIN: CPT | Performed by: PHYSICIAN ASSISTANT

## 2020-08-06 PROCEDURE — 80061 LIPID PANEL: CPT | Performed by: PHYSICIAN ASSISTANT

## 2020-08-06 RX ORDER — LEVOTHYROXINE SODIUM 112 UG/1
TABLET ORAL
Qty: 90 TABLET | Refills: 3 | Status: SHIPPED | OUTPATIENT
Start: 2020-08-06 | End: 2021-08-05

## 2020-08-06 NOTE — PROGRESS NOTES
"Subjective     Isidra Tomas is a 64 year old female who presents to clinic today for recheck on chronic medical conditions:     HPI   Hypothyroidism Follow-up      Since last visit, patient describes the following symptoms: Weight stable, no hair loss, no skin changes, no constipation, no loose stools        Walking daily      Any additional acute concerns today:     Not sleeping well  Dr. Bajwa gave Lorazepam.   Has a lot of anxiety  Some nights can fall asleep     noticed \"bubble on left side of throat\"  No pain.     Left clicking of arm when reaching behind car seats          Patient Active Problem List   Diagnosis     Lumbago     Family history of other cardiovascular diseases     ACP (advance care planning)     Health Care Home     Malignant melanoma of skin of upper limb, including shoulder, right (H)     Acquired hypothyroidism     Past Surgical History:   Procedure Laterality Date     COLONOSCOPY  2014    Normal/ repeat in 10 yrs     HC COLONOSCOPY THRU STOMA, DIAGNOSTIC  2005    [place] [facility] repeat in 10 yrs       Social History     Tobacco Use     Smoking status: Never Smoker     Smokeless tobacco: Never Used   Substance Use Topics     Alcohol use: Yes     Alcohol/week: 1.7 standard drinks     Types: 2 Standard drinks or equivalent per week     Comment: occasional, 2 oz per week     Family History   Problem Relation Age of Onset     Thyroid Disease Maternal Grandmother      Heart Disease Father          at age 47     Lipids Sister         two sisters with heart disease     Heart Disease Mother         valvular disease     Eye Disorder Mother         macular degeneration     C.A.D. Brother          71 yo, [place]           Current Outpatient Medications   Medication Sig Dispense Refill     Cholecalciferol (VITAMIN D) 1000 UNIT capsule Take 1 capsule by mouth daily.       levothyroxine (SYNTHROID/LEVOTHROID) 112 MCG tablet TAKE 1 TABLET BY MOUTH  DAILY 90 tablet 3     Multiple " Vitamins-Calcium (ONE-A-DAY WOMENS FORMULA) TABS Take 1 tablet by mouth daily.       Allergies   Allergen Reactions     Erythromycin      does not tolerate well     Recent Labs   Lab Test 06/20/19  1030 06/07/18  1226 11/16/17  1053  06/11/14  1048   LDL  --   --  117*  --  127   HDL  --   --  100  --  94   TRIG  --   --  53  --  68   CR  --   --  0.67  --  0.70   GFRESTIMATED  --   --  94  --  95   POTASSIUM  --   --  3.9  --  4.0   TSH 2.88 1.42  --    < >  --     < > = values in this interval not displayed.        BP Readings from Last 3 Encounters:   08/06/20 120/88   02/21/20 132/82   02/12/20 130/82    Wt Readings from Last 3 Encounters:   08/06/20 76 kg (167 lb 9.6 oz)   02/21/20 73.9 kg (163 lb)   02/12/20 73.5 kg (162 lb)                             Review of Systems     CONSTITUTIONAL:NEGATIVE for fever, chills, change in weight  EYES: NEGATIVE for vision changes or irritation  ENT/MOUTH: NEGATIVE for ear, mouth and throat problems  RESP:NEGATIVE for significant cough or SOB  CV: NEGATIVE for chest pain, palpitations or peripheral edema  GI: NEGATIVE for nausea, abdominal pain, heartburn, or change in bowel habits  NEURO: NEGATIVE for weakness, dizziness or paresthesias  ENDOCRINE: NEGATIVE for temperature intolerance, skin/hair changes  PSYCHIATRIC: NEGATIVE for changes in mood or affect          Objective    /88 (BP Location: Right arm, Patient Position: Sitting, Cuff Size: Adult Large)   Pulse 69   Temp 98.1  F (36.7  C) (Oral)   Wt 76 kg (167 lb 9.6 oz)   LMP 03/20/2007   SpO2 98%   BMI 27.89 kg/m    Body mass index is 27.89 kg/m .    Physical Exam     GENERAL: healthy, alert and no distress  EYES: Eyes grossly normal to inspection, PERRL and conjunctivae and sclerae normal  HENT: ear canals and TM's normal, nose and mouth without ulcers or lesions  NECK: no adenopathy, no asymmetry, masses, or scars and thyroid normal to palpation  RESP: lungs clear to auscultation - no rales, rhonchi or  "wheezes  CV: regular rate and rhythm, normal S1 S2, no S3 or S4, no murmur, click or rub, no peripheral edema and peripheral pulses strong  MS: no gross musculoskeletal defects noted, no edema    Diagnostic Test Results:  Labs reviewed in Epic      Lymphadenopathy:  Anterior cervical - N  Posterior cervical - N  Preauricular - N  Posterior auricular - N  Occipital - N  Submental - N  Submandibular - N  Supraclavicular - N  Parotid - N  Tonsillar  - N          Assessment & Plan     1. Acquired hypothyroidism    - T4 free  - TSH  - VENOUS COLLECTION  - levothyroxine (SYNTHROID/LEVOTHROID) 112 MCG tablet; TAKE 1 TABLET BY MOUTH  DAILY  Dispense: 90 tablet; Refill: 3    2. Overweight    - Lipid Panel (BFP)  - VENOUS COLLECTION    3. Pure hypercholesterolemia    - Lipid Panel (BFP)  - VENOUS COLLECTION    4. Primary insomnia  Sent email         BMI:   Estimated body mass index is 27.12 kg/m  as calculated from the following:    Height as of 2/12/20: 1.651 m (5' 5\").    Weight as of 2/21/20: 73.9 kg (163 lb).   Weight management plan: Discussed healthy diet and exercise guidelines        Work on weight loss  Regular exercise      FANNY Fernandez  [facility]        "

## 2020-08-07 LAB
T4, FREE, NON-DIALYSIS - QUEST: 1.4 NG/DL (ref 0.8–1.8)
TSH SERPL-ACNC: 2.32 MIU/L (ref 0.4–4.5)

## 2020-08-08 PROBLEM — E78.00 PURE HYPERCHOLESTEROLEMIA: Status: ACTIVE | Noted: 2020-08-08

## 2020-09-30 ENCOUNTER — TRANSFERRED RECORDS (OUTPATIENT)
Dept: FAMILY MEDICINE | Facility: CLINIC | Age: 65
End: 2020-09-30

## 2020-09-30 LAB — MAMMOGRAM: NORMAL

## 2020-12-09 ENCOUNTER — OFFICE VISIT (OUTPATIENT)
Dept: FAMILY MEDICINE | Facility: CLINIC | Age: 65
End: 2020-12-09

## 2020-12-09 VITALS
TEMPERATURE: 97.8 F | HEIGHT: 65 IN | HEART RATE: 92 BPM | BODY MASS INDEX: 27.69 KG/M2 | RESPIRATION RATE: 20 BRPM | WEIGHT: 166.2 LBS | DIASTOLIC BLOOD PRESSURE: 84 MMHG | SYSTOLIC BLOOD PRESSURE: 134 MMHG

## 2020-12-09 DIAGNOSIS — I49.9 IRREGULAR HEARTBEAT: Primary | ICD-10-CM

## 2020-12-09 DIAGNOSIS — R10.32 LLQ ABDOMINAL PAIN: ICD-10-CM

## 2020-12-09 DIAGNOSIS — R06.09 DYSPNEA ON EXERTION: ICD-10-CM

## 2020-12-09 DIAGNOSIS — K59.09 CHRONIC CONSTIPATION: ICD-10-CM

## 2020-12-09 PROCEDURE — 99214 OFFICE O/P EST MOD 30 MIN: CPT | Performed by: PHYSICIAN ASSISTANT

## 2020-12-09 PROCEDURE — 93000 ELECTROCARDIOGRAM COMPLETE: CPT | Performed by: PHYSICIAN ASSISTANT

## 2020-12-09 ASSESSMENT — MIFFLIN-ST. JEOR: SCORE: 1294.76

## 2020-12-09 NOTE — PROGRESS NOTES
Subjective     Nursing Notes:   Kay Mcrae, DIANA  12/9/2020  1:21 PM  Signed  Isidra Tomas is here for possible irregular heart beat and also recheck of her left side pain.        Isidra Tomas is a 65 year old female who presents to clinic today for the following health issues:    HPI       Nini is here with concerns for an irregular heart beat for about a year. She also endorses some SOB.  I saw her in 2015 with concerns for SOB and given strong family hx of CAD stress test was completed and was normal. Dr. Bajwa also ordered coronary CT scan which was 0 in 2017.   Pt notes that her heart will beat quickly for a few minutes. She does note some lightheadedness when these irregular heart beats occur. She notes also occ. Dull ache in chest.     Mother with mitral valve issue  - had surgical repair    Family hx: Father CAD  Brother CAD  Younger sister CAD    Pt also had LLQ pain in Feb  CT completed which was normal.   Has been taking Miralax but sometimes stools are too loose          ROS:  Constitutional: NEGATIVE for fever, chills, change in weight  Eyes: NEGATIVE for vision changes or irritation  Ears: NEGATIVE for pain, discharge, decreased hearing  Nose: NEGATIVE for rhinorrhea, epistaxis or congestion  Mouth: NEGATIVE for ulcerations, sore throat.   R: SOB with exertion - lifelong non-smoker.   CV: 2 years palpitations   GI: chronic constipation - colonoscopy 2014 normal   LLQ pain intermittently     : NEGATIVE for frequency, dysuria, or hematuria  Neuro: NEGATIVE for headaches, weakness, syncope      Patient Active Problem List   Diagnosis     Lumbago     Family history of other cardiovascular diseases     ACP (advance care planning)     Health Care Home     Malignant melanoma of skin of upper limb, including shoulder, right (H)     Acquired hypothyroidism     Pure hypercholesterolemia     Past Surgical History:   Procedure Laterality Date     COLONOSCOPY  7/31/2014    Normal/ repeat in 10  yrs     HC COLONOSCOPY THRU STOMA, DIAGNOSTIC  2005    [place] [facility] repeat in 10 yrs       Social History     Tobacco Use     Smoking status: Never Smoker     Smokeless tobacco: Never Used   Substance Use Topics     Alcohol use: Yes     Alcohol/week: 1.7 standard drinks     Types: 2 Standard drinks or equivalent per week     Comment: occasional, 2 oz per week     Family History   Problem Relation Age of Onset     Thyroid Disease Maternal Grandmother      Heart Disease Father          at age 47     Lipids Sister         two sisters with heart disease     Heart Disease Mother         valvular disease     Eye Disorder Mother         macular degeneration     C.A.D. Brother          69 yo, [place]           Current Outpatient Medications   Medication Sig Dispense Refill     Cholecalciferol (VITAMIN D) 1000 UNIT capsule Take 1 capsule by mouth daily.       levothyroxine (SYNTHROID/LEVOTHROID) 112 MCG tablet TAKE 1 TABLET BY MOUTH  DAILY 90 tablet 3     Multiple Vitamins-Calcium (ONE-A-DAY WOMENS FORMULA) TABS Take 1 tablet by mouth daily.       Allergies   Allergen Reactions     Erythromycin      does not tolerate well     Recent Labs   Lab Test 20  1118 20  0959 19  1030 17  1053 17  1053 14  1048 14  1048   *  --   --   --  117*  --  127     --   --   --  100  --  94   TRIG 72  --   --   --  53  --  68   CR  --   --   --   --  0.67  --  0.70   GFRESTIMATED  --   --   --   --  94  --  95   POTASSIUM  --   --   --   --  3.9  --  4.0   TSH  --  2.32 2.88   < >  --    < >  --     < > = values in this interval not displayed.        BP Readings from Last 3 Encounters:   20 134/84   20 120/88   20 132/82    Wt Readings from Last 3 Encounters:   20 75.4 kg (166 lb 3.2 oz)   20 76 kg (167 lb 9.6 oz)   20 73.9 kg (163 lb)              Objective    /84 (BP Location: Right arm, Patient Position: Chair, Cuff Size: Adult  "Regular)   Pulse 92   Temp 97.8  F (36.6  C)   Resp 20   Ht 1.651 m (5' 5\")   Wt 75.4 kg (166 lb 3.2 oz)   LMP 03/20/2007   BMI 27.66 kg/m    Body mass index is 27.66 kg/m .  Physical Exam     GENERAL: healthy, alert and no distress  Head: Normocephalic, atraumatic.  Eyes: Conjunctiva clear, no discharge  Ears: External ears and TMs normal BL.  Nose: Nasal mucosa pink and moist. No discharge.  Mouth / Throat: Mucous membranes moist. Normal dentition.  Pharynx non-erythematous, no exudates.   Neck: Supple, No thyromegaly or nodules. No lymphadenopathy.  RESP: lungs clear to auscultation - no rales, rhonchi or wheezes  CV: regular rate and rhythm, normal S1 S2, no S3 or S4, no murmur, click or rub, no peripheral edema and peripheral pulses strong    EKG NSR             Assessment & Plan   1. Irregular heartbeat  See orders  If all normal will order cardiology consult  - EKG 12-lead complete w/read - Clinics  - Echocardiogram Complete; Future  - RADIOLOGY REFERRAL  - Zio Patch Holter Adult Pediatric Greater than 48 hrs; Future    2. LLQ abdominal pain    - US Pelvic Complete with Transvaginal; Future  - RADIOLOGY REFERRAL    3. Dyspnea on exertion  No indication for CXR today  If cardiac w/u normal will order CXR and Pulm consult  - Echocardiogram Complete; Future  - RADIOLOGY REFERRAL  - Zio Patch Holter Adult Pediatric Greater than 48 hrs; Future    4. Chronic constipation    - GASTROENTEROLOGY ADULT REF CONSULT ONLY; Future      Pt advised to go to ED if concerning SOB or CP or if palpitations do not spontaneously resolve within 10 min    FANNY Fernandez  [facility]    "

## 2020-12-14 ENCOUNTER — HOSPITAL ENCOUNTER (OUTPATIENT)
Dept: CARDIOLOGY | Facility: CLINIC | Age: 65
Discharge: HOME | End: 2020-12-14
Attending: PHYSICIAN ASSISTANT | Admitting: PHYSICIAN ASSISTANT
Payer: COMMERCIAL

## 2020-12-14 ENCOUNTER — TELEPHONE (OUTPATIENT)
Dept: FAMILY MEDICINE | Facility: CLINIC | Age: 65
End: 2020-12-14

## 2020-12-14 DIAGNOSIS — I49.9 IRREGULAR HEARTBEAT: ICD-10-CM

## 2020-12-14 DIAGNOSIS — D25.9 UTERINE LEIOMYOMA, UNSPECIFIED LOCATION: ICD-10-CM

## 2020-12-14 DIAGNOSIS — E03.9 ACQUIRED HYPOTHYROIDISM: ICD-10-CM

## 2020-12-14 DIAGNOSIS — R10.32 LLQ ABDOMINAL PAIN: ICD-10-CM

## 2020-12-14 DIAGNOSIS — N84.1 ENDOCERVICAL POLYP: ICD-10-CM

## 2020-12-14 DIAGNOSIS — I49.9 IRREGULAR HEARTBEAT: Primary | ICD-10-CM

## 2020-12-14 DIAGNOSIS — R06.09 DYSPNEA ON EXERTION: ICD-10-CM

## 2020-12-14 LAB
% GRANULOCYTES: 59.6 %
HCT VFR BLD AUTO: 45.2 % (ref 35–47)
HEMOGLOBIN: 14.8 G/DL (ref 11.7–15.7)
LYMPHOCYTES NFR BLD AUTO: 32 %
MCH RBC QN AUTO: 31.1 PG (ref 26–33)
MCHC RBC AUTO-ENTMCNC: 32.7 G/DL (ref 31–36)
MCV RBC AUTO: 95 FL (ref 78–100)
MONOCYTES NFR BLD AUTO: 8.4 %
PLATELET COUNT - QUEST: 295 10^9/L (ref 150–375)
RBC # BLD AUTO: 4.76 10*12/L (ref 3.8–5.2)
WBC # BLD AUTO: 8.8 10*9/L (ref 4–11)

## 2020-12-14 PROCEDURE — 85025 COMPLETE CBC W/AUTO DIFF WBC: CPT | Performed by: PHYSICIAN ASSISTANT

## 2020-12-14 PROCEDURE — 36415 COLL VENOUS BLD VENIPUNCTURE: CPT | Performed by: PHYSICIAN ASSISTANT

## 2020-12-14 PROCEDURE — 93306 TTE W/DOPPLER COMPLETE: CPT

## 2020-12-14 NOTE — TELEPHONE ENCOUNTER
Called pt  LM with  to call back    Needs recheck TSH and Hemoglobin - lab only    Referral to OBGYN for fibroids and cervical polyp       Pt called back and notified    Sun Beck PA-C  12/14/2020

## 2020-12-15 LAB
T4, FREE, NON-DIALYSIS - QUEST: 1.3 NG/DL (ref 0.8–1.8)
TSH SERPL-ACNC: 2.68 MIU/L (ref 0.4–4.5)

## 2020-12-18 ENCOUNTER — HOSPITAL ENCOUNTER (OUTPATIENT)
Dept: CARDIOLOGY | Facility: CLINIC | Age: 65
Discharge: HOME | End: 2020-12-18
Attending: PHYSICIAN ASSISTANT | Admitting: PHYSICIAN ASSISTANT
Payer: COMMERCIAL

## 2020-12-18 DIAGNOSIS — R06.09 DYSPNEA ON EXERTION: ICD-10-CM

## 2020-12-18 DIAGNOSIS — I49.9 IRREGULAR HEARTBEAT: ICD-10-CM

## 2020-12-18 PROCEDURE — 0298T LEADLESS EKG MONITOR 3 TO 14 DAYS: CPT | Performed by: INTERNAL MEDICINE

## 2020-12-18 PROCEDURE — 0296T LEADLESS EKG MONITOR 3 TO 14 DAYS: CPT

## 2020-12-31 ENCOUNTER — MYC MEDICAL ADVICE (OUTPATIENT)
Dept: FAMILY MEDICINE | Facility: CLINIC | Age: 65
End: 2020-12-31

## 2021-01-04 ENCOUNTER — TRANSFERRED RECORDS (OUTPATIENT)
Dept: FAMILY MEDICINE | Facility: CLINIC | Age: 66
End: 2021-01-04

## 2021-01-06 ENCOUNTER — TELEPHONE (OUTPATIENT)
Dept: FAMILY MEDICINE | Facility: CLINIC | Age: 66
End: 2021-01-06

## 2021-01-06 NOTE — TELEPHONE ENCOUNTER
Reviewed Holter  SOB improved.    Constipation has improved  Still some discomfort  Starts at belly button  Burning sensation    Advised Follow-up with GI.    Will monitor palpitations/SOB for now as they are improving  Cardiology/Pulmonlogy if worsening    Sun Beck PA-C  1/6/2021

## 2021-01-20 ENCOUNTER — TRANSFERRED RECORDS (OUTPATIENT)
Dept: FAMILY MEDICINE | Facility: CLINIC | Age: 66
End: 2021-01-20

## 2021-01-22 ENCOUNTER — TRANSFERRED RECORDS (OUTPATIENT)
Dept: FAMILY MEDICINE | Facility: CLINIC | Age: 66
End: 2021-01-22

## 2021-01-26 ENCOUNTER — OFFICE VISIT (OUTPATIENT)
Dept: FAMILY MEDICINE | Facility: CLINIC | Age: 66
End: 2021-01-26

## 2021-01-26 VITALS
TEMPERATURE: 98 F | HEART RATE: 74 BPM | DIASTOLIC BLOOD PRESSURE: 70 MMHG | HEIGHT: 66 IN | SYSTOLIC BLOOD PRESSURE: 116 MMHG | BODY MASS INDEX: 26.87 KG/M2 | WEIGHT: 167.2 LBS | OXYGEN SATURATION: 99 %

## 2021-01-26 DIAGNOSIS — E78.00 PURE HYPERCHOLESTEROLEMIA: ICD-10-CM

## 2021-01-26 DIAGNOSIS — Z01.818 PREOP GENERAL PHYSICAL EXAM: Primary | ICD-10-CM

## 2021-01-26 DIAGNOSIS — D25.9 UTERINE LEIOMYOMA, UNSPECIFIED LOCATION: ICD-10-CM

## 2021-01-26 DIAGNOSIS — N84.0 ENDOMETRIAL POLYP: ICD-10-CM

## 2021-01-26 DIAGNOSIS — E03.9 ACQUIRED HYPOTHYROIDISM: ICD-10-CM

## 2021-01-26 PROBLEM — C43.61 MALIGNANT MELANOMA OF SKIN OF UPPER LIMB, INCLUDING SHOULDER, RIGHT (H): Status: RESOLVED | Noted: 2019-07-11 | Resolved: 2021-01-26

## 2021-01-26 LAB — HEMOGLOBIN: 15.4 G/DL (ref 11.7–15.7)

## 2021-01-26 PROCEDURE — 99214 OFFICE O/P EST MOD 30 MIN: CPT | Performed by: PHYSICIAN ASSISTANT

## 2021-01-26 PROCEDURE — 85018 HEMOGLOBIN: CPT | Performed by: PHYSICIAN ASSISTANT

## 2021-01-26 PROCEDURE — 36415 COLL VENOUS BLD VENIPUNCTURE: CPT | Performed by: PHYSICIAN ASSISTANT

## 2021-01-26 ASSESSMENT — MIFFLIN-ST. JEOR: SCORE: 1311.19

## 2021-01-26 NOTE — PROGRESS NOTES
[facility]  [address] [place] [address]  [address]  [facility] [address]  Phone: [phone]  Fax: [phone]  Primary Provider: Faith Kaur  Pre-op Performing Provider: FAITH KAUR    PREOPERATIVE EVALUATION:  Today's date: 1/26/2021    Isidra Tomas is a 65 year old female who presents for a preoperative evaluation.    Surgical Information:  Surgery/Procedure: Hysteroscopy  Surgery Location: [facility]  Surgeon: Dr. Andres  Surgery Date: 2/5/2021  Time of Surgery: 9am  Where patient plans to recover: At home with family  Fax number for surgical facility: [phone]    Type of Anesthesia Anticipated: to be determined    Subjective     HPI related to upcoming procedure:  Polyp and fibroids found on imaging    1. No - Have you ever had a heart attack or stroke?  2. No - Have you ever had surgery on your heart or blood vessels, such as a stent, coronary (heart) bypass, or surgery on an artery in the head, neck, heart, or legs?  3. No - Do you have chest pain when you are physically active?  4. No - Do you have a history of heart failure?  5. No - Do you currently have a cold, bronchitis, or symptoms of other respiratory (head and chest) infections?  6. No - Do you have a cough, shortness of breath, or wheezing?  7. No - Do you or anyone in your family have a history of blood clots?  8. No - Do you or anyone in your family have a serious bleeding problem, such as long-lasting bleeding after surgeries or cuts?  9. No - Have you ever had anemia or been told to take iron pills?  10. No - Have you had any abnormal blood loss such as black, tarry or bloody stools, or abnormal vaginal bleeding?  11. No - Have you ever had a blood transfusion?  12. Yes - Are you willing to have a blood transfusion if it is medically needed before, during, or after your surgery?  13. YES - Have you or anyone in your family ever had problems with anesthesia (sedation for surgery)?  Post-op Nausea  14. No - Do you have sleep apnea, excessive snoring, or daytime drowsiness?   15. No - Do you have any artifical heart valves or other implanted medical devices, such as a pacemaker, defibrillator, or continuous glucose monitor?  16. No - Do you have any artifical joints?  17. No - Are you allergic to latex?  18. No - Is there any chance that you may be pregnant?    Health Care Directive:  Patient does not have a Health Care Directive or Living Will: Patient states has Advance Directive and will bring in a copy to clinic.    Preoperative Review of :   reviewed - no records found      Status of Chronic Conditions:  HYPOTHYROIDISM - Patient has a longstanding history of chronic Hypothyroidism. Patient has been doing well, noting no tremor, insomnia, hair loss or changes in skin texture. Continues to take medications as directed, without adverse reactions or side effects. Last TSH   Lab Results   Component Value Date    TSH 2.68 12/14/2020   .        Review of Systems  CONSTITUTIONAL: NEGATIVE for fever, chills, change in weight  INTEGUMENTARY/SKIN: NEGATIVE for worrisome rashes, moles or lesions  EYES: NEGATIVE for vision changes or irritation  ENT/MOUTH: NEGATIVE for ear, mouth and throat problems  RESP: NEGATIVE for significant cough or SOB  BREAST: NEGATIVE for masses, tenderness or discharge  CV: NEGATIVE for chest pain, palpitations or peripheral edema  GI: NEGATIVE for nausea, abdominal pain, heartburn, or change in bowel habits  : NEGATIVE for frequency, dysuria, or hematuria  MUSCULOSKELETAL: NEGATIVE for significant arthralgias or myalgia  NEURO: NEGATIVE for weakness, dizziness or paresthesias  ENDOCRINE: NEGATIVE for temperature intolerance, skin/hair changes  HEME: NEGATIVE for bleeding problems  PSYCHIATRIC: NEGATIVE for changes in mood or affect    Patient Active Problem List    Diagnosis Date Noted     Pure hypercholesterolemia 08/08/2020     Priority: Medium     Acquired  hypothyroidism 08/06/2020     Priority: Medium     Health Care Home 06/17/2013     Priority: Medium     State Tier Level:  Tier 1  Status:  n/a  Care Coordinator:    See Letters for [facility] Care Plan             ACP (advance care planning) 07/09/2012     Priority: Medium     Advance Care Planning 9/23/2015: ACP Review and Resources Provided:  Reviewed chart for advance care plan.  Isidra Tomas has no plan or code status on file. Discussed available resources and provided with information. Confirmed code status reflects current choices pending further ACP discussions.  Confirmed/documented legally designated decision maker(s). Added by ELEAZAR DONALD  Advance Care Planning 5/5/2017: ACP Review of Chart / Resources Provided:  Reviewed chart for advance care plan.  Isidra Tomas has no plan or code status on file. Discussed available resources and provided with information.   Added by Glendy Rahman                         Family history of other cardiovascular diseases 04/29/2005     Priority: Medium     Problem list name updated by automated process. Provider to review and confirm  Problem list name updated by automated process. Provider to review       Lumbago 04/01/2004     Priority: Medium      History reviewed. No pertinent past medical history.  Past Surgical History:   Procedure Laterality Date     COLONOSCOPY  7/31/2014    Normal/ repeat in 10 yrs     HC COLONOSCOPY THRU STOMA, DIAGNOSTIC  6/2005    Normal/ [place] [facility]: repeat in 10 yrs     Current Outpatient Medications   Medication Sig Dispense Refill     Cholecalciferol (VITAMIN D) 1000 UNIT capsule Take 1 capsule by mouth daily.       levothyroxine (SYNTHROID/LEVOTHROID) 112 MCG tablet TAKE 1 TABLET BY MOUTH  DAILY 90 tablet 3     Multiple Vitamins-Calcium (ONE-A-DAY WOMENS FORMULA) TABS Take 1 tablet by mouth daily.         Allergies   Allergen Reactions     Erythromycin Diarrhea     does not tolerate well        Social History     Tobacco Use      "Smoking status: Never Smoker     Smokeless tobacco: Never Used   Substance Use Topics     Alcohol use: Yes     Alcohol/week: 1.7 standard drinks     Types: 2 Standard drinks or equivalent per week     Comment: occasional, 2 oz per week     Family History   Problem Relation Age of Onset     Thyroid Disease Maternal Grandmother      Heart Disease Father          at age 47     Lipids Sister      Heart Disease Sister      Heart Disease Mother         valvular disease     Eye Disorder Mother         macular degeneration     C.A.D. Brother          71 yo, [place]     Rheumatoid Arthritis Sister      History   Drug Use No         Objective     /70 (BP Location: Left arm, Patient Position: Sitting, Cuff Size: Adult Large)   Pulse 74   Temp 98  F (36.7  C) (Oral)   Ht 1.67 m (5' 5.75\")   Wt 75.8 kg (167 lb 3.2 oz)   LMP 2007   SpO2 99%   BMI 27.19 kg/m      Physical Exam    GENERAL APPEARANCE: healthy, alert and no distress     EYES: EOMI, PERRL     HENT: ear canals and TM's normal and nose and mouth without ulcers or lesions     NECK: no adenopathy, no asymmetry, masses, or scars and thyroid normal to palpation     RESP: lungs clear to auscultation - no rales, rhonchi or wheezes     CV: regular rates and rhythm, normal S1 S2, no S3 or S4 and no murmur, click or rub     ABDOMEN:  soft, nontender, no HSM or masses and bowel sounds normal     MS: extremities normal- no gross deformities noted, no evidence of inflammation in joints, FROM in all extremities.     SKIN: no suspicious lesions or rashes     NEURO: Normal strength and tone, sensory exam grossly normal, mentation intact and speech normal     PSYCH: mentation appears normal. and affect normal/bright     LYMPHATICS: No cervical adenopathy    Recent Labs   Lab Test 20  1307 20  1327   HGB 14.8 15.3    229        Diagnostics:  Recent Results (from the past 24 hour(s))   HEMOGLOBIN (BFP)    Collection Time: 21 12:00 AM "   Result Value Ref Range    Hemoglobin 15.4 11.7 - 15.7 g/dL      EKG: appears normal, NSR, normal axis, normal intervals, no acute ST/T changes c/w ischemia, no LVH by voltage criteria, DATE OF EKG WAS 12/9/20    Revised Cardiac Risk Index (RCRI):  The patient has the following serious cardiovascular risks for perioperative complications:   - No serious cardiac risks = 0 points     RCRI Interpretation: 0 points: Class I (very low risk - 0.4% complication rate)             Assessment & Plan   The proposed surgical procedure is considered INTERMEDIATE risk.    Preop general physical exam    - VENOUS COLLECTION  - HEMOGLOBIN (BFP)    Endometrial polyp      Uterine leiomyoma, unspecified location      Pure hypercholesterolemia      Acquired hypothyroidism        Risks and Recommendations:  The patient has the following additional risks and recommendations for perioperative complications:   - No identified additional risk factors other than previously addressed    Medication Instructions:  Patient is to take all scheduled medications on the day of surgery    RECOMMENDATION:  APPROVAL GIVEN to proceed with proposed procedure, without further diagnostic evaluation.    Signed Electronically by: FANNY Fernandez    Copy of this evaluation report is provided to requesting physician.    [facility] Preop Guidelines    Revised Cardiac Risk Index

## 2021-02-05 ENCOUNTER — HOSPITAL PATHOLOGY (OUTPATIENT)
Dept: OTHER | Facility: CLINIC | Age: 66
End: 2021-02-05

## 2021-02-08 LAB — COPATH REPORT: NORMAL

## 2021-02-10 ENCOUNTER — TRANSFERRED RECORDS (OUTPATIENT)
Dept: FAMILY MEDICINE | Facility: CLINIC | Age: 66
End: 2021-02-10

## 2021-03-09 ENCOUNTER — IMMUNIZATION (OUTPATIENT)
Dept: NURSING | Facility: CLINIC | Age: 66
End: 2021-03-09
Payer: COMMERCIAL

## 2021-03-09 PROCEDURE — 0011A PR COVID VAC MODERNA 100 MCG/0.5 ML IM: CPT

## 2021-03-09 PROCEDURE — 91301 PR COVID VAC MODERNA 100 MCG/0.5 ML IM: CPT

## 2021-04-06 ENCOUNTER — IMMUNIZATION (OUTPATIENT)
Dept: NURSING | Facility: CLINIC | Age: 66
End: 2021-04-06
Attending: INTERNAL MEDICINE
Payer: COMMERCIAL

## 2021-04-06 PROCEDURE — 91301 PR COVID VAC MODERNA 100 MCG/0.5 ML IM: CPT

## 2021-04-06 PROCEDURE — 0012A PR COVID VAC MODERNA 100 MCG/0.5 ML IM: CPT

## 2021-05-09 ENCOUNTER — HEALTH MAINTENANCE LETTER (OUTPATIENT)
Age: 66
End: 2021-05-09

## 2021-07-22 ENCOUNTER — TRANSFERRED RECORDS (OUTPATIENT)
Dept: FAMILY MEDICINE | Facility: CLINIC | Age: 66
End: 2021-07-22

## 2021-08-05 ENCOUNTER — OFFICE VISIT (OUTPATIENT)
Dept: FAMILY MEDICINE | Facility: CLINIC | Age: 66
End: 2021-08-05

## 2021-08-05 VITALS
DIASTOLIC BLOOD PRESSURE: 70 MMHG | OXYGEN SATURATION: 98 % | HEART RATE: 68 BPM | SYSTOLIC BLOOD PRESSURE: 110 MMHG | TEMPERATURE: 97.2 F | WEIGHT: 164.6 LBS | BODY MASS INDEX: 26.77 KG/M2

## 2021-08-05 DIAGNOSIS — Z23 NEED FOR VACCINATION: ICD-10-CM

## 2021-08-05 DIAGNOSIS — E03.9 ACQUIRED HYPOTHYROIDISM: Primary | ICD-10-CM

## 2021-08-05 PROCEDURE — 90471 IMMUNIZATION ADMIN: CPT | Performed by: PHYSICIAN ASSISTANT

## 2021-08-05 PROCEDURE — 36415 COLL VENOUS BLD VENIPUNCTURE: CPT | Performed by: PHYSICIAN ASSISTANT

## 2021-08-05 PROCEDURE — 99213 OFFICE O/P EST LOW 20 MIN: CPT | Mod: 25 | Performed by: PHYSICIAN ASSISTANT

## 2021-08-05 PROCEDURE — 90732 PPSV23 VACC 2 YRS+ SUBQ/IM: CPT | Performed by: PHYSICIAN ASSISTANT

## 2021-08-05 RX ORDER — LEVOTHYROXINE SODIUM 112 UG/1
TABLET ORAL
Qty: 90 TABLET | Refills: 3 | Status: SHIPPED | OUTPATIENT
Start: 2021-08-05 | End: 2022-07-18

## 2021-08-05 NOTE — PROGRESS NOTES
Assessment & Plan     Acquired hypothyroidism  Labs pending  - T4 FREE ([facility])  - TSH ([facility])  - VENOUS COLLECTION  - levothyroxine (SYNTHROID/LEVOTHROID) 112 MCG tablet  Dispense: 90 tablet; Refill: 3    Need for vaccination    - PNEUMOCOCCAL VACCINE,ADULT,SQ OR IM        FUTURE APPOINTMENTS:       - Follow-up office 1 year fasting FANNY Valentin  [facility]    Subjective     Nursing Notes:   Tuyet Smith CMA  8/5/2021  3:41 PM  Signed  Chief Complaint   Patient presents with     Recheck Medication     Pre-Visit Screening:  Immunizations:pneu 23  Colonoscopy:NA  Mammogram:NA  Asthma Action Test/Plan:NA  PHQ9:NA  GAD7:NA  Questioned patient about current smoking habits Pt.never  OK to leave a detailed message on voice mail for today's visit yes, phone # [phone]           Isidra Tomas is a 65 year old female who presents to clinic today for the following health issues     HPI         Hypothyroidism Follow-up      Since last visit, patient describes the following symptoms: Weight stable, no hair loss, no skin changes, no constipation, no loose stools              Current Medications  Current Outpatient Medications   Medication Sig Dispense Refill     Cholecalciferol (VITAMIN D) 1000 UNIT capsule Take 1 capsule by mouth daily.       levothyroxine (SYNTHROID/LEVOTHROID) 112 MCG tablet TAKE 1 TABLET BY MOUTH  DAILY 90 tablet 3     Multiple Vitamins-Calcium (ONE-A-DAY WOMENS FORMULA) TABS Take 1 tablet by mouth daily.           Constitutional, HEENT, Cardiovascular, Pulmonary, GI and  systems are negative, except as otherwise noted.          Objective    /70 (BP Location: Right arm, Patient Position: Sitting, Cuff Size: Adult Large)   Pulse 68   Temp 97.2  F (36.2  C)   Wt 74.7 kg (164 lb 9.6 oz)   LMP 03/20/2007   SpO2 98%   BMI 26.77 kg/m    Body mass index is 26.77 kg/m .  Physical Exam   GENERAL: healthy, alert and no distress  EYES: Eyes grossly  normal to inspection, PERRL and conjunctivae and sclerae normal  HENT: ear canals and TM's normal, nose and mouth without ulcers or lesions  NECK: no adenopathy, no asymmetry, masses, or scars and thyroid normal to palpation  RESP: lungs clear to auscultation - no rales, rhonchi or wheezes  CV: regular rate and rhythm, normal S1 S2, no S3 or S4, no murmur, click or rub, no peripheral edema and peripheral pulses strong  MS: no gross musculoskeletal defects noted, no edema

## 2021-08-05 NOTE — NURSING NOTE
Chief Complaint   Patient presents with     Recheck Medication     Pre-Visit Screening:  Immunizations:pneu 23  Colonoscopy:NA  Mammogram:NA  Asthma Action Test/Plan:NA  PHQ9:NA  GAD7:NA  Questioned patient about current smoking habits Pt.never  OK to leave a detailed message on voice mail for today's visit yes, phone # [phone]

## 2021-08-06 LAB
T4, FREE, NON-DIALYSIS - QUEST: 1.5 NG/DL (ref 0.8–1.8)
TSH SERPL-ACNC: 2.68 MIU/L (ref 0.4–4.5)

## 2021-10-24 ENCOUNTER — HEALTH MAINTENANCE LETTER (OUTPATIENT)
Age: 66
End: 2021-10-24

## 2022-01-24 ENCOUNTER — TRANSFERRED RECORDS (OUTPATIENT)
Dept: FAMILY MEDICINE | Facility: CLINIC | Age: 67
End: 2022-01-24

## 2022-05-09 ENCOUNTER — OFFICE VISIT (OUTPATIENT)
Dept: FAMILY MEDICINE | Facility: CLINIC | Age: 67
End: 2022-05-09

## 2022-05-09 VITALS
HEIGHT: 66 IN | WEIGHT: 168.8 LBS | HEART RATE: 67 BPM | TEMPERATURE: 97.7 F | SYSTOLIC BLOOD PRESSURE: 112 MMHG | OXYGEN SATURATION: 97 % | BODY MASS INDEX: 27.13 KG/M2 | DIASTOLIC BLOOD PRESSURE: 72 MMHG

## 2022-05-09 DIAGNOSIS — R22.1 NECK MASS: Primary | ICD-10-CM

## 2022-05-09 DIAGNOSIS — Z85.820 HISTORY OF MELANOMA: ICD-10-CM

## 2022-05-09 PROCEDURE — 99214 OFFICE O/P EST MOD 30 MIN: CPT | Performed by: FAMILY MEDICINE

## 2022-05-09 RX ORDER — VITS A,C,E/LUTEIN/MINERALS 300MCG-200
1 TABLET ORAL DAILY
COMMUNITY
End: 2023-07-20

## 2022-05-09 NOTE — NURSING NOTE
Chief Complaint   Patient presents with     Neck Problem     Left side of neck feels swollen noticed 1 week ago, can feel a small lump when turning her head to the side, some pain ad burning started today      Pre-visit Screening:  Immunizations:  up to date  Colonoscopy:  is up to date  Mammogram: is up to date  Asthma Action Test/Plan:  NA  PHQ9:  NA  GAD7:  NA  Questioned patient about current smoking habits Pt. has never smoked.  Ok to leave detailed message on voice mail for today's visit only Yes, phone # [phone]

## 2022-05-09 NOTE — PROGRESS NOTES
"Assessment & Plan   Problem List Items Addressed This Visit    None     Visit Diagnoses     Neck mass    -  Primary    Relevant Orders    Adult ENT  Referral    History of melanoma        Relevant Orders    Adult ENT  Referral         1. Neck mass  I will do a ct neck soft tissue to look at thie area closer.  - Radiology Referral; Future  - CT Soft Tissue Neck w Contrast; Future             FUTURE APPOINTMENTS:       - Follow-up visit per results.    No follow-ups on file.    Keysha Pennington MD  [facility]    Subjective     Nursing Notes:   Chanda Quick CMA  5/9/2022  2:24 PM  Signed  Chief Complaint   Patient presents with     Neck Problem     Left side of neck feels swollen noticed 1 week ago, can feel a small lump when turning her head to the side, some pain ad burning started today      Pre-visit Screening:  Immunizations:  up to date  Colonoscopy:  is up to date  Mammogram: is up to date  Asthma Action Test/Plan:  NA  PHQ9:  NA  GAD7:  NA  Questioned patient about current smoking habits Pt. has never smoked.  Ok to leave detailed message on voice mail for today's visit only Yes, phone # [phone]           Isidra Tomas is a 66 year old female who presents to clinic today for the following health issues   HPI     Weird thing on and off for 2 weeks, left side. Swelling and feeling sore. Wondering if she should be concerned. First time. occ has an ache into the ear. Not recently sick. Has been healthy. No sore throat, no problems with swallowing. In feb had a root canal and this wasn't infected. It was on this side. Not sore in the jaw.         Review of Systems   Constitutional, HEENT, cardiovascular, pulmonary, gi and gu systems are negative, except as otherwise noted.      Objective    /72 (BP Location: Left arm, Patient Position: Sitting, Cuff Size: Adult Regular)   Pulse 67   Temp 97.7  F (36.5  C) (Temporal)   Ht 1.67 m (5' 5.75\")   Wt 76.6 kg (168 lb " 12.8 oz)   LMP 03/20/2007   SpO2 97%   BMI 27.45 kg/m    Body mass index is 27.45 kg/m .  Physical Exam   GENERAL: healthy, alert and no distress  RESP: lungs clear to auscultation - no rales, rhonchi or wheezes  CV: regular rate and rhythm, normal S1 S2, no S3 or S4, no murmur, click or rub, no peripheral edema and peripheral pulses strong  MS: no gross musculoskeletal defects noted, no edema  NEURO: Normal strength and tone, mentation intact and speech normal  PSYCH: mentation appears normal, affect normal/bright  Neck: small mass in anterior cervical chain firm but mobile  No supraclavicular nodes palpable.    No results found for any visits on 05/09/22.

## 2022-05-18 ENCOUNTER — TELEPHONE (OUTPATIENT)
Dept: FAMILY MEDICINE | Facility: CLINIC | Age: 67
End: 2022-05-18

## 2022-05-18 DIAGNOSIS — R22.1 NECK MASS: Primary | ICD-10-CM

## 2022-05-18 NOTE — TELEPHONE ENCOUNTER
Cesar from [facility] called stating pt's CT scan of her neck soft tissue is pending denial. Her insurance is denying stating not enough notes to back this up and or they prefer an ultrasound first. Pt is scheduled for 05/24 to have the scan done.     Will need peer to peer to get approved # [phone]  Case # [id number]    Thanks, Chanda ORTIZ

## 2022-05-19 NOTE — TELEPHONE ENCOUNTER
Called Cesar. Faxed over new order for US. She will cancel CT call pt to schedule US. Can always do PA on CT if still needed once US is done based on results.

## 2022-05-31 ENCOUNTER — TRANSFERRED RECORDS (OUTPATIENT)
Dept: FAMILY MEDICINE | Facility: CLINIC | Age: 67
End: 2022-05-31

## 2022-06-05 ENCOUNTER — HEALTH MAINTENANCE LETTER (OUTPATIENT)
Age: 67
End: 2022-06-05

## 2022-06-06 ENCOUNTER — TRANSFERRED RECORDS (OUTPATIENT)
Dept: FAMILY MEDICINE | Facility: CLINIC | Age: 67
End: 2022-06-06

## 2022-06-06 DIAGNOSIS — R22.1 NECK MASS: Primary | ICD-10-CM

## 2022-06-08 ENCOUNTER — MYC MEDICAL ADVICE (OUTPATIENT)
Dept: FAMILY MEDICINE | Facility: CLINIC | Age: 67
End: 2022-06-08

## 2022-06-08 NOTE — TELEPHONE ENCOUNTER
Can you review the CT and needle biopsy. I do not see results from biopsy. I can fax these to ENT.    Thanks, Chanda ORTIZ

## 2022-06-14 ENCOUNTER — OFFICE VISIT (OUTPATIENT)
Dept: FAMILY MEDICINE | Facility: CLINIC | Age: 67
End: 2022-06-14

## 2022-06-14 VITALS
BODY MASS INDEX: 26.68 KG/M2 | HEART RATE: 72 BPM | DIASTOLIC BLOOD PRESSURE: 78 MMHG | SYSTOLIC BLOOD PRESSURE: 118 MMHG | WEIGHT: 166 LBS | OXYGEN SATURATION: 97 % | HEIGHT: 66 IN | TEMPERATURE: 97.4 F

## 2022-06-14 DIAGNOSIS — F41.8 SITUATIONAL ANXIETY: Primary | ICD-10-CM

## 2022-06-14 DIAGNOSIS — M26.609 TMJ (TEMPOROMANDIBULAR JOINT DISORDER): ICD-10-CM

## 2022-06-14 DIAGNOSIS — E03.9 HYPOTHYROIDISM, UNSPECIFIED TYPE: ICD-10-CM

## 2022-06-14 DIAGNOSIS — R22.1 NECK MASS: ICD-10-CM

## 2022-06-14 PROCEDURE — 36415 COLL VENOUS BLD VENIPUNCTURE: CPT | Performed by: PHYSICIAN ASSISTANT

## 2022-06-14 PROCEDURE — 99214 OFFICE O/P EST MOD 30 MIN: CPT | Performed by: PHYSICIAN ASSISTANT

## 2022-06-14 RX ORDER — LORAZEPAM 0.5 MG/1
0.5 TABLET ORAL EVERY 6 HOURS PRN
Qty: 10 TABLET | Refills: 0 | Status: SHIPPED | OUTPATIENT
Start: 2022-06-14 | End: 2023-01-25

## 2022-06-14 RX ORDER — HYDROXYZINE HYDROCHLORIDE 25 MG/1
25 TABLET, FILM COATED ORAL 3 TIMES DAILY PRN
Qty: 30 TABLET | Refills: 0 | Status: SHIPPED | OUTPATIENT
Start: 2022-06-14 | End: 2022-07-18

## 2022-06-14 NOTE — NURSING NOTE
Chief Complaint   Patient presents with     Ear Problem     Left ear pain that radiates from her ear to her jaw, off and on for a month. Possible related to swollen lymphs that she has been experiencing.         Pre-visit Screening:  Immunizations:  up to date  Colonoscopy:  is up to date  Mammogram: is up to date  Asthma Action Test/Plan:  NA  PHQ9:  NA  GAD7:  NA  Questioned patient about current smoking habits Pt. has never smoked.  Ok to leave detailed message on voice mail for today's visit only Yes, phone # [phone]

## 2022-06-14 NOTE — PROGRESS NOTES
"CC: Recheck ears, neck    History:  Recheck ear, neck:  First noticed a general neck swelling in early May. Saw my colleague Dr. Pennington, who ordered a CT scan after finding palpable firm nodule in anterior cervical chain, especially given pt's history of melanoma. CT was not initially approved, so had soft tissue US 5/20/2022 that did show several enlarged cervical lymph nodes on left side.  Followed up with ENT, Dr. Garvey 5/27/2022. Had CT scan 6/6/2022 that confirmed reactive lymph nodes, but malignancy could not be rule out. Pt underwent US biopsy 6/6/2022 and results were fortunately benign. She spoke with Dr. Garvey or at least support staff, who explained this is reassuring and to recheck in 3 months.     Pt continues to have somewhat swollen sensation in neck. Also continues to have burning in left ear, that radiates down towards chin that started approximately 2 weeks after she felt the neck symptoms. Comes and goes.  Does not think ENT looked in her ear as neck was the focus. No pattern to it. Has been taking Tylenol/iburpofen as needed. Does think she carries stress in her jaw.     Anxiety:  Does feel more anxious panicked with this work-up, especially with her history of melanoma in 2019. Does not feel that things were well explained, and ENT visit felt extremely fast. Feels panicked. Wondering if there is anything she can take even temporarily to calm nerves. Does think she carries stress in her jaw. Does admit to some pain with chewing. No locking. Has been told she clenches/grinds.    Did have a root canal back in February lower left teeth. Recovered well.     PMH, MEDICATIONS, ALLERGIES, SOCIAL AND FAMILY HISTORY in [facility] and reviewed by me personally.    ROS negative other than the symptoms noted above in the HPI.      Examination   /78 (BP Location: Right arm, Patient Position: Sitting, Cuff Size: Adult Large)   Pulse 72   Temp 97.4  F (36.3  C) (Temporal)   Ht 1.67 m (5' 5.75\")   Wt 75.3 " kg (166 lb)   LMP 03/20/2007   SpO2 97%   BMI 27.00 kg/m       Constitutional: Sitting comfortably, in no acute distress. Vital signs noted  Eyes: pupils equal round reactive to light and accomodation, extra ocular movements intact  Ears: external canals and TMs free of abnormalities. No mastoid tenderness.   Nose: patent, without mucosal abnormalities  Mouth and throat: without erythema or lesions of the mucosa. Tenderness to palpation over left TMJ.  Neck:  no adenopathy, trachea midline and normal to palpation, thyroid normal to palpation  Cardiovascular:  regular rate and rhythm, no murmurs, clicks, or gallops  Respiratory:  normal respiratory rate and rhythm, lungs clear to auscultation  SKIN: No jaundice/pallor/rash.   Psychiatric: mentation appears normal and affect normal/bright      A/P    ICD-10-CM    1. Situational anxiety  F41.8 hydrOXYzine (ATARAX) 25 MG tablet     LORazepam (ATIVAN) 0.5 MG tablet   2. Hypothyroidism, unspecified type  E03.9 VENOUS COLLECTION     Thyroid Peroxidase and Thyroglob Atb ([facility])   3. Neck mass  R22.1        DISCUSSION:  Anxiety/ear/jaw pain:  Anxiety contributing to symptomology. And suspect ear and jaw pain is related to tension, clenching in jaw. Onset was after her concerns with neck mass started. Recommended jaw rest, heat 2-3 times daily, and take naproxen 200 mg 1 tablet twice daily with food to help with inflammation for 1-2 weeks. This will hopefully resolve ear/jaw and possibly neck symptoms.    Also agreed to prescribe hydroxyzine to use as needed for anxiety. Warned of drowsiness, side effects. Also agreed to prescribe small quantity of Ativan to take INSTEAD of hydroxyzine only if really needed. Warned pt of the possible side effects of benzodiazepine medications, including drowsiness. Warned of addictive potential, and urged pt to use sparingly. No alcohol or driving while taking.    Neck mass/ear pain:  At this point, patient has completed initial work-up  with reassuring findings. Recommended continue with the plan to recheck in 3 months, or sooner if not improving/resolved.    Thyroid:  CT scan had commented on surgically absent thyroid. She has know hypothyroid. Not sure if it is autoimmune/Hasimoto's. Agreed to check for antibodies and contact with results.     follow up visit: As needed    Time of visit: 35 minutes    [facility]

## 2022-06-15 LAB
THYROGLOBULIN ANTIBODY: <1 IU/ML (ref 0–40)
THYROID PEROXIDASE AB: 1 IU/ML

## 2022-06-24 ENCOUNTER — MYC MEDICAL ADVICE (OUTPATIENT)
Dept: FAMILY MEDICINE | Facility: CLINIC | Age: 67
End: 2022-06-24

## 2022-07-18 ENCOUNTER — OFFICE VISIT (OUTPATIENT)
Dept: FAMILY MEDICINE | Facility: CLINIC | Age: 67
End: 2022-07-18

## 2022-07-18 VITALS
BODY MASS INDEX: 27.16 KG/M2 | TEMPERATURE: 97.6 F | OXYGEN SATURATION: 99 % | WEIGHT: 167 LBS | DIASTOLIC BLOOD PRESSURE: 84 MMHG | RESPIRATION RATE: 22 BRPM | HEART RATE: 64 BPM | SYSTOLIC BLOOD PRESSURE: 126 MMHG

## 2022-07-18 DIAGNOSIS — E03.9 ACQUIRED HYPOTHYROIDISM: Primary | ICD-10-CM

## 2022-07-18 DIAGNOSIS — G50.0 TRIGEMINAL NEURALGIA: ICD-10-CM

## 2022-07-18 DIAGNOSIS — J01.00 ACUTE NON-RECURRENT MAXILLARY SINUSITIS: ICD-10-CM

## 2022-07-18 LAB
ERYTHROCYTE [DISTWIDTH] IN BLOOD BY AUTOMATED COUNT: 13.2 %
ERYTHROCYTE [SEDIMENTATION RATE] IN BLOOD: 12 MM/HR (ref 0–20)
HCT VFR BLD AUTO: 42.6 % (ref 35–47)
HEMOGLOBIN: 13.9 G/DL (ref 11.7–15.7)
MCH RBC QN AUTO: 31.2 PG (ref 26–33)
MCHC RBC AUTO-ENTMCNC: 32.6 G/DL (ref 31–36)
MCV RBC AUTO: 95.8 FL (ref 78–100)
PLATELET COUNT - QUEST: 334 10^9/L (ref 150–375)
RBC # BLD AUTO: 4.45 10*12/L (ref 3.8–5.2)
WBC # BLD AUTO: 7.9 10*9/L (ref 4–11)

## 2022-07-18 PROCEDURE — 85651 RBC SED RATE NONAUTOMATED: CPT | Performed by: PHYSICIAN ASSISTANT

## 2022-07-18 PROCEDURE — 36415 COLL VENOUS BLD VENIPUNCTURE: CPT | Performed by: PHYSICIAN ASSISTANT

## 2022-07-18 PROCEDURE — 99213 OFFICE O/P EST LOW 20 MIN: CPT | Performed by: PHYSICIAN ASSISTANT

## 2022-07-18 PROCEDURE — 85027 COMPLETE CBC AUTOMATED: CPT | Performed by: PHYSICIAN ASSISTANT

## 2022-07-18 RX ORDER — LEVOTHYROXINE SODIUM 112 UG/1
TABLET ORAL
Qty: 90 TABLET | Refills: 3 | Status: SHIPPED | OUTPATIENT
Start: 2022-07-18 | End: 2023-07-20

## 2022-07-18 NOTE — PROGRESS NOTES
Assessment & Plan     Acquired hypothyroidism    - VENOUS COLLECTION  - TSH ([facility])  - T4 FREE ([facility])  - levothyroxine (SYNTHROID/LEVOTHROID) 112 MCG tablet  Dispense: 90 tablet; Refill: 3    Acute non-recurrent maxillary sinusitis  Trial of Augmentin  - amoxicillin-clavulanate (AUGMENTIN) 875-125 MG tablet  Dispense: 20 tablet; Refill: 0    Trigeminal neuralgia    - Hemogram with Platelets ([facility])  - VENOUS COLLECTION  - ESR WESTERGREN ([facility])      If nerve pain not gone with abx will order MRI to look for trigeminal compression      FANNY Fernandez  [facility]    Subjective     Nursing Notes:   Patito Posada CMA  7/18/2022  3:53 PM  Signed  Chief Complaint   Patient presents with     Recheck Medication     Non fasting medication check for thyroid     Follow Up     Follow up on neck concerns, has had enlarged lymph nodes but started having pain in jaw and face area     Pre-visit Screening:  Immunizations:  up to date  Colonoscopy:  is up to date  Mammogram: is up to date  Asthma Action Test/Plan:  NA  PHQ9:  NA  GAD7:  NA  Questioned patient about current smoking habits Pt. has never smoked.  Ok to leave detailed message on voice mail for today's visit only Yes, phone # [phone]                 Isidra Tomas is a 66 year old female who presents to clinic today for the following health issues     HPI     Recent workup for lymphadenopathy in neck.  Continues to have nerve pain/pressure left side of face, piercing under mandible and pain left cheek.     Anxiety increased due to this - started on Vistaril and Ativan per Elaina.   Vistaril - woke with eyes swollen - added to allergy list.       Hypothyroidism Follow-up      Since last visit, patient describes the following symptoms: Weight stable, no hair loss, no skin changes, no constipation, no loose stools              Current Medications  Current Outpatient Medications   Medication Sig Dispense Refill      amoxicillin-clavulanate (AUGMENTIN) 875-125 MG tablet Take 1 tablet by mouth 2 times daily 20 tablet 0     Cholecalciferol (VITAMIN D) 1000 UNIT capsule Take 1 capsule by mouth daily.       levothyroxine (SYNTHROID/LEVOTHROID) 112 MCG tablet TAKE 1 TABLET BY MOUTH  DAILY 90 tablet 3     Multiple Vitamins-Calcium (ONE-A-DAY WOMENS FORMULA) TABS Take 1 tablet by mouth daily.       multivitamin (OCUVITE) TABS tablet Take 1 tablet by mouth daily       LORazepam (ATIVAN) 0.5 MG tablet Take 1 tablet (0.5 mg) by mouth every 6 hours as needed for anxiety - do not drive or drink alcohol when taking (Patient not taking: Reported on 7/18/2022) 10 tablet 0         Constitutional, HEENT, Cardiovascular, Pulmonary, GI and  systems are negative, except as otherwise noted.          Objective    /84 (BP Location: Right arm, Patient Position: Sitting, Cuff Size: Adult Large)   Pulse 64   Temp 97.6  F (36.4  C) (Temporal)   Resp 22   Wt 75.8 kg (167 lb)   LMP 03/20/2007   SpO2 99%   BMI 27.16 kg/m    Body mass index is 27.16 kg/m .  Physical Exam   GENERAL: healthy, alert and no distress  Head: Normocephalic, atraumatic.  Eyes: Conjunctiva clear, no discharge  Ears: External ears and TMs normal BL.  Nose: Nasal mucosa pink and moist. No discharge.  Mouth / Throat: Mucous membranes moist. Normal dentition.  Pharynx non-erythematous, no exudates.   Neck: Supple, No thyromegaly or nodules. No lymphadenopathy.  RESP: lungs clear to auscultation - no rales, rhonchi or wheezes  CV: regular rate and rhythm, normal S1 S2, no S3 or S4, no murmur, click or rub, no peripheral edema and peripheral pulses strong  MS: no gross musculoskeletal defects noted    Lymphadenopathy:  Anterior cervical - N  Posterior cervical - N  Preauricular - N  Posterior auricular - N  Occipital - N  Submental - N  Submandibular - N  Supraclavicular - N  Parotid - N  Tonsillar  - N        Mental Status Exam:   Appearance: calm  Grooming: adequately  groomed  Demeanor: engaged, cooperative  Affect: normal  Speech: Normal.  Gait:Normal.  Movements: Normal  Form of thought: Logical, Linear and Goal directed  Thought content:  Normal  Insight:Good   Judgment: Good   Cognition: Good

## 2022-07-18 NOTE — NURSING NOTE
Chief Complaint   Patient presents with     Recheck Medication     Non fasting medication check for thyroid     Follow Up     Follow up on neck concerns, has had enlarged lymph nodes but started having pain in jaw and face area     Pre-visit Screening:  Immunizations:  up to date  Colonoscopy:  is up to date  Mammogram: is up to date  Asthma Action Test/Plan:  NA  PHQ9:  NA  GAD7:  NA  Questioned patient about current smoking habits Pt. has never smoked.  Ok to leave detailed message on voice mail for today's visit only Yes, phone # [phone]

## 2022-07-20 LAB
T4, FREE, NON-DIALYSIS - QUEST: 1.3 NG/DL (ref 0.8–1.8)
TSH SERPL-ACNC: 3.97 MIU/L (ref 0.4–4.5)

## 2022-07-25 ENCOUNTER — TRANSFERRED RECORDS (OUTPATIENT)
Dept: FAMILY MEDICINE | Facility: CLINIC | Age: 67
End: 2022-07-25

## 2022-08-26 ENCOUNTER — TRANSFERRED RECORDS (OUTPATIENT)
Dept: FAMILY MEDICINE | Facility: CLINIC | Age: 67
End: 2022-08-26

## 2022-09-07 ENCOUNTER — OFFICE VISIT (OUTPATIENT)
Dept: FAMILY MEDICINE | Facility: CLINIC | Age: 67
End: 2022-09-07

## 2022-09-07 VITALS
HEIGHT: 66 IN | SYSTOLIC BLOOD PRESSURE: 136 MMHG | TEMPERATURE: 97.2 F | WEIGHT: 166.7 LBS | HEART RATE: 72 BPM | RESPIRATION RATE: 20 BRPM | BODY MASS INDEX: 26.79 KG/M2 | DIASTOLIC BLOOD PRESSURE: 70 MMHG

## 2022-09-07 DIAGNOSIS — Z71.89 ACP (ADVANCE CARE PLANNING): ICD-10-CM

## 2022-09-07 DIAGNOSIS — R22.1 NECK SWELLING: Primary | ICD-10-CM

## 2022-09-07 DIAGNOSIS — R51.9 FACIAL PAIN: ICD-10-CM

## 2022-09-07 DIAGNOSIS — F41.8 SITUATIONAL ANXIETY: ICD-10-CM

## 2022-09-07 PROCEDURE — 99213 OFFICE O/P EST LOW 20 MIN: CPT | Performed by: FAMILY MEDICINE

## 2022-09-07 NOTE — PROGRESS NOTES
SUBJECTIVE:  Isidra Tomas, a 66 year old female scheduled an appointment to discuss the following issues:     Neck swelling  Facial pain  Pt has been dealing with left neck swelling for 3 months now- reviewed records both here and at ENT.  US and CT scan neck revealed multiple slightly enlarged nodes, bx was performed on one node  and showed benign findings.  Pt feels one particular node on left neck has not decreased at all in size.  She further notes shock like pains in left face and jaw that come and go.      Pt does relate significant anxiety related to above ongoing symptoms     Pt worried something bad is going on with nodes.       Medical, social, surgical, and family histories reviewed.    Patient Active Problem List   Diagnosis     Lumbago     Family history of other cardiovascular diseases     ACP (advance care planning)     Health Care Home     Acquired hypothyroidism     Pure hypercholesterolemia     No past medical history on file.  Family History   Problem Relation Age of Onset     Thyroid Disease Maternal Grandmother      Heart Disease Father          at age 47     Lipids Sister      Heart Disease Sister      Heart Disease Mother         valvular disease     Eye Disorder Mother         macular degeneration     C.A.D. Brother          71 yo, [place]     Rheumatoid Arthritis Sister      Social History     Socioeconomic History     Marital status:      Spouse name: Not on file     Number of children: Not on file     Years of education: Not on file     Highest education level: Not on file   Occupational History     Not on file   Tobacco Use     Smoking status: Never Smoker     Smokeless tobacco: Never Used   Substance and Sexual Activity     Alcohol use: Yes     Alcohol/week: 1.7 standard drinks     Types: 2 Standard drinks or equivalent per week     Comment: occasional, 2 oz per week     Drug use: No     Sexual activity: Yes     Partners: Male   Other Topics Concern     Parent/sibling w/  CABG, MI or angioplasty before 65F 55M? Not Asked   Social History Narrative     Not on file     Social Determinants of Health     Financial Resource Strain: Not on file   Food Insecurity: Not on file   Transportation Needs: Not on file   Physical Activity: Not on file   Stress: Not on file   Social Connections: Not on file   Intimate Partner Violence: Not on file   Housing Stability: Not on file     Past Surgical History:   Procedure Laterality Date     COLONOSCOPY  07/31/2014    Normal/ repeat in 10 yrs     MALIGNANT SKIN LESION EXCISION Right 06/2019    melanoma right arm, [facility], Dr. Riley     [facility] COLONOSCOPY THRU STOMA, DIAGNOSTIC  06/01/2005    Normal/ [place] [facility]: repeat in 10 yrs     Cholecalciferol (VITAMIN D) 1000 UNIT capsule, Take 1 capsule by mouth daily.  levothyroxine (SYNTHROID/LEVOTHROID) 112 MCG tablet, TAKE 1 TABLET BY MOUTH  DAILY  LORazepam (ATIVAN) 0.5 MG tablet, Take 1 tablet (0.5 mg) by mouth every 6 hours as needed for anxiety - do not drive or drink alcohol when taking  Multiple Vitamins-Calcium (ONE-A-DAY WOMENS FORMULA) TABS, Take 1 tablet by mouth daily.  multivitamin (OCUVITE) TABS tablet, Take 1 tablet by mouth daily    No current facility-administered medications on file prior to visit.       Allergies: Erythromycin and Hydroxyzine    Immunization History   Administered Date(s) Administered     COVID-19,PF,Moderna 03/09/2021, 04/06/2021, 11/09/2021     FLU 6-35 months 10/20/2009     FLUAD(HD)65+ QUAD 10/05/2020, 09/30/2021     Influenza (IIV3) PF 10/19/2010, 11/03/2011, 12/07/2012, 10/16/2013     Influenza Vaccine IM > 6 months Valent IIV4 (Alfuria,Fluzone) 10/09/2014, 09/23/2015, 11/07/2016, 10/20/2017, 10/16/2018, 10/15/2019     Pneumo Conj 13-V (2010&after) 11/07/2019     Pneumococcal 23 valent 08/05/2021     TD (ADULT, 7+) 01/01/1999     TDAP Vaccine (Boostrix) 04/30/2009     Tdap (Adacel,Boostrix) 09/30/2021     Zoster vaccine recombinant adjuvanted (SHINGRIX)  "08/13/2020, 10/16/2020     Zoster vaccine, live 10/09/2014        ROS:  CONSTITUTIONAL: NEGATIVE for fever, chills  EYES: NEGATIVE for vision changes   RESP: NEGATIVE for significant cough or SOB  CV: NEGATIVE for chest pain, palpitations   GI: NEGATIVE for nausea, abdominal pain, heartburn, or change in bowel habits  : NEGATIVE for frequency, dysuria, or hematuria  MUSCULOSKELETAL: NEGATIVE for significant arthralgias or myalgia  NEURO: NEGATIVE for weakness, dizziness or paresthesias or headache    OBJECTIVE:  /70 (BP Location: Left arm, Patient Position: Chair, Cuff Size: Adult Regular)   Pulse 72   Temp 97.2  F (36.2  C) (Temporal)   Resp 20   Ht 1.67 m (5' 5.75\")   Wt 75.6 kg (166 lb 11.2 oz)   LMP 03/20/2007   BMI 27.11 kg/m    EXAM:  GENERAL APPEARANCE: healthy, alert and no distress  EYES: EOMI,  PERRL  HENT: ear canals and TM's normal and nose and mouth without ulcers or lesions  NECK: cervical adenopathy left anterior, nontender  RESP: lungs clear to auscultation - no rales, rhonchi or wheezes  CV: regular rates and rhythm, normal S1 S2, no S3 or S4 and no murmur, click or rub -  ABDOMEN:  soft, nontender, no HSM or masses and bowel sounds normal  PSYCH: mentation appears normal and anxious    ASSESSMENT/PLAN:  (R22.1) Neck swelling  (primary encounter diagnosis)  Comment: pt with persistent left neck mass, likely node, previously assumed to be reactive but with no change I am concerned she need repeat imaging-ENT had recommended 3 mo follow up in June so recommend pt call ENT and let them know not better-if any issues reaching ENT she will call me-I suspect they will just want to reimage-? MRI  Plan: as above    (R51.9) Facial pain  Comment: possible facial pain syndrome -unclear overlay with node issue above-discussed with patient that I feel we need to investigate nodes first-if this issue stable.benign can consider gabapentin, tegretol, neurology referral for potential neurogenic facial " pain issues  Plan: as above

## 2022-09-07 NOTE — NURSING NOTE
Isidra Tomas is here for continued neck and jaw pain on the left side. Not getting better.    Questioned patient about current smoking habits.  Pt. has never smoked.  PULSE regular  My Chart: active  CLASSIFICATION OF OVERWEIGHT AND OBESITY BY BMI                        Obesity Class           BMI(kg/m2)  Underweight                                    < 18.5  Normal                                         18.5-24.9  Overweight                                     25.0-29.9  OBESITY                     I                  30.0-34.9                             II                 35.0-39.9  EXTREME OBESITY             III                >40                            Patient's  BMI Body mass index is 27.11 kg/m .  http://hin.nhlbi.nih.gov/menuplanner/menu.cgi  Pre-visit planning  Immunizations - up to date  Colonoscopy - is up to date  Mammogram - is up to date  Asthma -   PHQ9 -    JEET-7 -

## 2022-09-08 ENCOUNTER — TRANSFERRED RECORDS (OUTPATIENT)
Dept: FAMILY MEDICINE | Facility: CLINIC | Age: 67
End: 2022-09-08

## 2022-10-11 ENCOUNTER — TRANSFERRED RECORDS (OUTPATIENT)
Dept: FAMILY MEDICINE | Facility: CLINIC | Age: 67
End: 2022-10-11

## 2022-10-15 ENCOUNTER — HEALTH MAINTENANCE LETTER (OUTPATIENT)
Age: 67
End: 2022-10-15

## 2022-10-26 NOTE — TELEPHONE ENCOUNTER
noted   Consent: The patient's consent was obtained including but not limited to risks of crusting, scabbing, blistering, scarring, darker or lighter pigmentary change, recurrence, incomplete removal and infection. Price (Use Numbers Only, No Special Characters Or $): 50 Post-Care Instructions: I reviewed with the patient in detail post-care instructions. Patient is to wear sunprotection, and avoid picking at any of the treated lesions. Pt may apply Vaseline to crusted or scabbing areas Detail Level: Zone Anesthesia Type: 1% lidocaine with epinephrine

## 2023-01-13 ENCOUNTER — TRANSFERRED RECORDS (OUTPATIENT)
Dept: FAMILY MEDICINE | Facility: CLINIC | Age: 68
End: 2023-01-13

## 2023-01-20 NOTE — PROGRESS NOTES
[facility]  [address] [place] [address]  [address]  [facility] [address]  Phone: [phone]  Fax: [phone]  Primary Provider: Bruce Thompson  Pre-op Performing Provider: BRUCE THOMPSON      PREOPERATIVE EVALUATION:  Today's date: 1/25/2023    Isidra Tomas is a 67 year old female who presents for a preoperative evaluation.    Surgical Information:  Surgery/Procedure: Removal of lymph node from neck   Surgery Location: [facility]  Surgeon: Dr. Garvey   Surgery Date: 2/1/23  Time of Surgery: TBD  Where patient plans to recover: At home with family  Fax number for surgical facility: [phone]    Type of Anesthesia Anticipated: to be determined    Assessment & Plan     The proposed surgical procedure is considered INTERMEDIATE risk.    Pre-op exam  Proceed with surgery at surgeon's discretion.    - EKG 12-lead complete w/read - Clinics    Lymphadenopathy, anterior cervical    - EKG 12-lead complete w/read - Clinics        Risks and Recommendations:  The patient has the following additional risks and recommendations for perioperative complications:   - No identified additional risk factors other than previously addressed    Medication Instructions:  Hold all meds until after surgery    RECOMMENDATION:  APPROVAL GIVEN to proceed with proposed procedure, without further diagnostic evaluation.    Subjective     HPI related to upcoming procedure: Ongoing L anterior cervical lymphadenopathy for last 1 year    1. No - Have you ever had a heart attack or stroke?  2. No - Have you ever had surgery on your heart or blood vessels, such as a stent, coronary (heart) bypass, or surgery on an artery in the head, neck, heart, or legs?  3. No - Do you have chest pain when you are physically active?  4. No - Do you have a history of heart failure?  5. No - Do you currently have a cold, bronchitis, or symptoms of other respiratory (head and chest) infections?  6. No - Do you have a cough, shortness of breath, or  wheezing?  7. No - Do you or anyone in your family have a history of blood clots?  8. No - Do you or anyone in your family have a serious bleeding problem, such as long-lasting bleeding after surgeries or cuts?  9. No - Have you ever had anemia or been told to take iron pills?  10. No - Have you had any abnormal blood loss such as black, tarry or bloody stools, or abnormal vaginal bleeding?  11. No - Have you ever had a blood transfusion?  12. Yes - Are you willing to have a blood transfusion if it is medically needed before, during, or after your surgery?  13. No - Have you or anyone in your family ever had problems with anesthesia (sedation for surgery)?  14. No - Do you have sleep apnea, excessive snoring, or daytime drowsiness?   15. No - Do you have any artifical heart valves or other implanted medical devices, such as a pacemaker, defibrillator, or continuous glucose monitor?  16. No - Do you have any artifical joints?  17. No - Are you allergic to latex?  Health Care Directive:  Patient does not have a Health Care Directive or Living Will: Discussed advance care planning with patient; however, patient declined at this time.    Preoperative Review of :   reviewed - controlled substances reflected in medication list.      Status of Chronic Conditions:  HYPOTHYROIDISM - Patient has a longstanding history of chronic Hypothyroidism. Patient has been doing well, noting no tremor, insomnia, hair loss or changes in skin texture. Continues to take medications as directed, without adverse reactions or side effects. Last TSH   Lab Results   Component Value Date    TSH 3.97 07/18/2022   .      Review of Systems  CONSTITUTIONAL: NEGATIVE for fever, chills, change in weight  INTEGUMENTARY/SKIN: NEGATIVE for worrisome rashes, moles or lesions  EYES: NEGATIVE for vision changes or irritation  ENT/MOUTH: NEGATIVE for ear, mouth and throat problems  RESP: NEGATIVE for significant cough or SOB  CV: NEGATIVE for chest  pain, palpitations or peripheral edema  GI: NEGATIVE for nausea, abdominal pain, heartburn, or change in bowel habits  : NEGATIVE for frequency, dysuria, or hematuria  MUSCULOSKELETAL: NEGATIVE for significant arthralgias or myalgia  NEURO: NEGATIVE for weakness, dizziness or paresthesias  ENDOCRINE: NEGATIVE for temperature intolerance, skin/hair changes  HEME: NEGATIVE for bleeding problems  PSYCHIATRIC: NEGATIVE for changes in mood or affect    Patient Active Problem List    Diagnosis Date Noted     Pure hypercholesterolemia 08/08/2020     Priority: Medium     Acquired hypothyroidism 08/06/2020     Priority: Medium     Health Care Home 06/17/2013     Priority: Medium     State Tier Level:  Tier 1  Status:  n/a  Care Coordinator:    See Letters for [facility] Care Plan             ACP (advance care planning) 07/09/2012     Priority: Medium                Family history of other cardiovascular diseases 04/29/2005     Priority: Medium     Problem list name updated by automated process. Provider to review and confirm  Problem list name updated by automated process. Provider to review       Lumbago 04/01/2004     Priority: Medium      No past medical history on file.  Past Surgical History:   Procedure Laterality Date     COLONOSCOPY  07/31/2014    Normal/ repeat in 10 yrs     MALIGNANT SKIN LESION EXCISION Right 06/2019    melanoma right arm, [facility], Dr. Riley     [facility] COLONOSCOPY THRU STOMA, DIAGNOSTIC  06/01/2005    [place] [facility] repeat in 10 yrs     Current Outpatient Medications   Medication Sig Dispense Refill     Cholecalciferol (VITAMIN D) 1000 UNIT capsule Take 1 capsule by mouth daily.       levothyroxine (SYNTHROID/LEVOTHROID) 112 MCG tablet TAKE 1 TABLET BY MOUTH  DAILY 90 tablet 3     Multiple Vitamins-Calcium (ONE-A-DAY WOMENS FORMULA) TABS Take 1 tablet by mouth daily.       multivitamin (OCUVITE) TABS tablet Take 1 tablet by mouth daily         Allergies   Allergen Reactions     Erythromycin  "Diarrhea     does not tolerate well     Hydroxyzine Other (See Comments)     Swelling eyes        Social History     Tobacco Use     Smoking status: Never     Smokeless tobacco: Never   Substance Use Topics     Alcohol use: Yes     Alcohol/week: 1.7 standard drinks     Types: 2 Standard drinks or equivalent per week     Comment: occasional, 2 oz per week     Family History   Problem Relation Age of Onset     Thyroid Disease Maternal Grandmother      Heart Disease Father          at age 47     Lipids Sister      Heart Disease Sister      Heart Disease Mother         valvular disease     Eye Disorder Mother         macular degeneration     C.A.D. Brother          71 yo, [place]     Rheumatoid Arthritis Sister      History   Drug Use No         Objective     /84 (BP Location: Right arm, Patient Position: Sitting, Cuff Size: Adult Large)   Pulse 78   Temp 98  F (36.7  C) (Temporal)   Resp 20   Ht 1.664 m (5' 5.5\")   Wt 75.3 kg (166 lb)   LMP 2007   SpO2 99%   BMI 27.20 kg/m      Physical Exam    GENERAL APPEARANCE: healthy, alert and no distress     EYES: EOMI, PERRL     HENT: ear canals and TM's normal and nose and mouth without ulcers or lesions     NECK: no adenopathy, no asymmetry, masses, or scars and thyroid normal to palpation     RESP: lungs clear to auscultation - no rales, rhonchi or wheezes     CV: regular rates and rhythm, normal S1 S2, no S3 or S4 and no murmur, click or rub     ABDOMEN:  soft, nontender, no HSM or masses and bowel sounds normal     MS: extremities normal- no gross deformities noted, no evidence of inflammation in joints, FROM in all extremities.     SKIN: no suspicious lesions or rashes     NEURO: Normal strength and tone, sensory exam grossly normal, mentation intact and speech normal     PSYCH: mentation appears normal. and affect normal/bright     LYMPHATICS: No cervical adenopathy    Recent Labs   Lab Test 22  1625 21  0000   HGB 13.9 15.4     " --         Diagnostics:  No labs were ordered during this visit.   EKG: appears normal, NSR, normal axis, normal intervals, no acute ST/T changes c/w ischemia, no LVH by voltage criteria, unchanged from previous tracings    Revised Cardiac Risk Index (RCRI):  The patient has the following serious cardiovascular risks for perioperative complications:   - No serious cardiac risks = 0 points     RCRI Interpretation: 0 points: Class I (very low risk - 0.4% complication rate)           Signed Electronically by: Bruce Thompson PA-C  Copy of this evaluation report is provided to requesting physician.

## 2023-01-25 ENCOUNTER — OFFICE VISIT (OUTPATIENT)
Dept: FAMILY MEDICINE | Facility: CLINIC | Age: 68
End: 2023-01-25

## 2023-01-25 VITALS
HEART RATE: 78 BPM | BODY MASS INDEX: 26.68 KG/M2 | HEIGHT: 66 IN | OXYGEN SATURATION: 99 % | WEIGHT: 166 LBS | RESPIRATION RATE: 20 BRPM | SYSTOLIC BLOOD PRESSURE: 122 MMHG | TEMPERATURE: 98 F | DIASTOLIC BLOOD PRESSURE: 84 MMHG

## 2023-01-25 DIAGNOSIS — R59.0 LYMPHADENOPATHY, ANTERIOR CERVICAL: ICD-10-CM

## 2023-01-25 DIAGNOSIS — Z01.818 PRE-OP EXAM: Primary | ICD-10-CM

## 2023-01-25 PROCEDURE — 99214 OFFICE O/P EST MOD 30 MIN: CPT | Mod: 25 | Performed by: PHYSICIAN ASSISTANT

## 2023-01-25 PROCEDURE — 93000 ELECTROCARDIOGRAM COMPLETE: CPT | Performed by: PHYSICIAN ASSISTANT

## 2023-01-25 NOTE — NURSING NOTE
Chief Complaint   Patient presents with     Pre-Op Exam     DOS 2/1/23, removal of lymph node from neck, surgery being done by Dr. Garvey, surgery being held at [facility]

## 2023-01-26 ENCOUNTER — TRANSFERRED RECORDS (OUTPATIENT)
Dept: FAMILY MEDICINE | Facility: CLINIC | Age: 68
End: 2023-01-26

## 2023-02-01 ENCOUNTER — LAB REQUISITION (OUTPATIENT)
Dept: LAB | Facility: CLINIC | Age: 68
End: 2023-02-01
Payer: COMMERCIAL

## 2023-02-01 PROCEDURE — 88161 CYTOPATH SMEAR OTHER SOURCE: CPT | Mod: TC,ORL | Performed by: OTOLARYNGOLOGY

## 2023-02-01 PROCEDURE — 88189 FLOWCYTOMETRY/READ 16 & >: CPT | Performed by: STUDENT IN AN ORGANIZED HEALTH CARE EDUCATION/TRAINING PROGRAM

## 2023-02-01 PROCEDURE — 88185 FLOWCYTOMETRY/TC ADD-ON: CPT | Mod: ORL | Performed by: OTOLARYNGOLOGY

## 2023-02-01 PROCEDURE — 88184 FLOWCYTOMETRY/ TC 1 MARKER: CPT | Mod: ORL | Performed by: OTOLARYNGOLOGY

## 2023-02-02 LAB
PATH REPORT.COMMENTS IMP SPEC: NORMAL
PATH REPORT.FINAL DX SPEC: NORMAL
PATH REPORT.MICROSCOPIC SPEC OTHER STN: NORMAL
PATH REPORT.RELEVANT HX SPEC: NORMAL

## 2023-02-03 LAB
PATH REPORT.COMMENTS IMP SPEC: ABNORMAL
PATH REPORT.COMMENTS IMP SPEC: YES
PATH REPORT.FINAL DX SPEC: ABNORMAL
PATH REPORT.GROSS SPEC: ABNORMAL
PATH REPORT.MICROSCOPIC SPEC OTHER STN: ABNORMAL
PATH REPORT.RELEVANT HX SPEC: ABNORMAL
PHOTO IMAGE: ABNORMAL

## 2023-02-03 PROCEDURE — 88341 IMHCHEM/IMCYTCHM EA ADD ANTB: CPT | Mod: 26 | Performed by: PATHOLOGY

## 2023-02-03 PROCEDURE — 88342 IMHCHEM/IMCYTCHM 1ST ANTB: CPT | Mod: 26 | Performed by: PATHOLOGY

## 2023-02-03 PROCEDURE — 88307 TISSUE EXAM BY PATHOLOGIST: CPT | Mod: 26 | Performed by: PATHOLOGY

## 2023-02-03 PROCEDURE — 88365 INSITU HYBRIDIZATION (FISH): CPT | Mod: 26 | Performed by: PATHOLOGY

## 2023-02-06 ENCOUNTER — TRANSFERRED RECORDS (OUTPATIENT)
Dept: FAMILY MEDICINE | Facility: CLINIC | Age: 68
End: 2023-02-06

## 2023-02-09 ENCOUNTER — MEDICAL CORRESPONDENCE (OUTPATIENT)
Dept: HEALTH INFORMATION MANAGEMENT | Facility: CLINIC | Age: 68
End: 2023-02-09
Payer: COMMERCIAL

## 2023-02-13 DIAGNOSIS — C81.21: Primary | ICD-10-CM

## 2023-02-13 DIAGNOSIS — Z01.818 EXAMINATION PRIOR TO CHEMOTHERAPY: ICD-10-CM

## 2023-02-14 ENCOUNTER — TRANSFERRED RECORDS (OUTPATIENT)
Dept: FAMILY MEDICINE | Facility: CLINIC | Age: 68
End: 2023-02-14

## 2023-02-14 ENCOUNTER — HOSPITAL ENCOUNTER (OUTPATIENT)
Dept: CARDIOLOGY | Facility: CLINIC | Age: 68
Discharge: HOME | End: 2023-02-14
Attending: INTERNAL MEDICINE | Admitting: INTERNAL MEDICINE
Payer: COMMERCIAL

## 2023-02-14 ENCOUNTER — MYC MEDICAL ADVICE (OUTPATIENT)
Dept: INTERVENTIONAL RADIOLOGY/VASCULAR | Facility: CLINIC | Age: 68
End: 2023-02-14

## 2023-02-14 DIAGNOSIS — Z01.818 EXAMINATION PRIOR TO CHEMOTHERAPY: ICD-10-CM

## 2023-02-14 DIAGNOSIS — C81.21: ICD-10-CM

## 2023-02-14 LAB — LVEF ECHO: NORMAL

## 2023-02-14 PROCEDURE — 93306 TTE W/DOPPLER COMPLETE: CPT

## 2023-02-14 PROCEDURE — 93306 TTE W/DOPPLER COMPLETE: CPT | Mod: 26 | Performed by: INTERNAL MEDICINE

## 2023-02-15 ENCOUNTER — ANCILLARY PROCEDURE (OUTPATIENT)
Dept: GENERAL RADIOLOGY | Facility: CLINIC | Age: 68
End: 2023-02-15
Attending: INTERNAL MEDICINE
Payer: COMMERCIAL

## 2023-02-15 DIAGNOSIS — C81.21: ICD-10-CM

## 2023-02-15 PROCEDURE — 71046 X-RAY EXAM CHEST 2 VIEWS: CPT

## 2023-02-16 ENCOUNTER — HOSPITAL ENCOUNTER (OUTPATIENT)
Dept: NUCLEAR MEDICINE | Facility: CLINIC | Age: 68
Setting detail: NUCLEAR MEDICINE
Discharge: HOME | End: 2023-02-16
Attending: INTERNAL MEDICINE | Admitting: INTERNAL MEDICINE
Payer: COMMERCIAL

## 2023-02-16 DIAGNOSIS — C81.21: ICD-10-CM

## 2023-02-16 PROCEDURE — 78582 LUNG VENTILAT&PERFUS IMAGING: CPT

## 2023-02-16 PROCEDURE — 343N000001 HC RX 343: Performed by: INTERNAL MEDICINE

## 2023-02-16 PROCEDURE — A9567 TECHNETIUM TC-99M AEROSOL: HCPCS | Performed by: INTERNAL MEDICINE

## 2023-02-16 PROCEDURE — 272N000035 NM LUNG SCAN VENTILATION AND PERFUSION

## 2023-02-16 PROCEDURE — A9540 TC99M MAA: HCPCS | Performed by: INTERNAL MEDICINE

## 2023-02-16 RX ADMIN — KIT FOR THE PREPARATION OF TECHNETIUM TC 99M ALBUMIN AGGREGATED 6 MILLICURIE: 2.5 INJECTION, POWDER, FOR SOLUTION INTRAVENOUS at 09:24

## 2023-02-16 RX ADMIN — KIT FOR THE PREPARATION OF TECHNETIUM TC 99M PENTETATE 63 MILLICURIE: 20 INJECTION, POWDER, LYOPHILIZED, FOR SOLUTION INTRAVENOUS; RESPIRATORY (INHALATION) at 08:55

## 2023-02-16 NOTE — TELEPHONE ENCOUNTER
Writer has spoken with Nini regarding planned procedure with IR via telephone.      Nini acknowledges understanding of pre-procedure instructions.         I have provided Nini with IR number ([phone]) for questions or concerns.    A documented H&P exam is noted in patient EMR with the date 2/10/2023.    Lisa DOYLE  Interventional Radiology RN   [phone]

## 2023-02-17 RX ORDER — ACETAMINOPHEN 325 MG/1
650 TABLET ORAL
Status: CANCELLED | OUTPATIENT
Start: 2023-02-17

## 2023-02-20 ENCOUNTER — TRANSFERRED RECORDS (OUTPATIENT)
Dept: FAMILY MEDICINE | Facility: CLINIC | Age: 68
End: 2023-02-20

## 2023-02-20 ENCOUNTER — HOSPITAL ENCOUNTER (OUTPATIENT)
Facility: CLINIC | Age: 68
Discharge: HOME | End: 2023-02-20
Attending: INTERNAL MEDICINE | Admitting: RADIOLOGY
Payer: COMMERCIAL

## 2023-02-20 ENCOUNTER — APPOINTMENT (OUTPATIENT)
Dept: INTERVENTIONAL RADIOLOGY/VASCULAR | Facility: CLINIC | Age: 68
End: 2023-02-20
Attending: INTERNAL MEDICINE
Payer: COMMERCIAL

## 2023-02-20 VITALS
DIASTOLIC BLOOD PRESSURE: 64 MMHG | SYSTOLIC BLOOD PRESSURE: 112 MMHG | RESPIRATION RATE: 16 BRPM | OXYGEN SATURATION: 98 % | HEART RATE: 63 BPM | TEMPERATURE: 98 F

## 2023-02-20 DIAGNOSIS — C81.90 HODGKIN'S LYMPHOMA (H): ICD-10-CM

## 2023-02-20 PROCEDURE — 36561 INSERT TUNNELED CV CATH: CPT

## 2023-02-20 PROCEDURE — 250N000009 HC RX 250

## 2023-02-20 PROCEDURE — 76937 US GUIDE VASCULAR ACCESS: CPT

## 2023-02-20 PROCEDURE — 77001 FLUOROGUIDE FOR VEIN DEVICE: CPT

## 2023-02-20 PROCEDURE — C1769 GUIDE WIRE: HCPCS

## 2023-02-20 PROCEDURE — 250N000009 HC RX 250: Performed by: PHYSICIAN ASSISTANT

## 2023-02-20 PROCEDURE — 250N000011 HC RX IP 250 OP 636

## 2023-02-20 PROCEDURE — C1788 PORT, INDWELLING, IMP: HCPCS

## 2023-02-20 PROCEDURE — 99152 MOD SED SAME PHYS/QHP 5/>YRS: CPT

## 2023-02-20 PROCEDURE — 272N000604 HC WOUND GLUE CR3

## 2023-02-20 PROCEDURE — 250N000011 HC RX IP 250 OP 636: Performed by: PHYSICIAN ASSISTANT

## 2023-02-20 PROCEDURE — 272N000504 HC NEEDLE CR4

## 2023-02-20 RX ORDER — CEFAZOLIN SODIUM 2 G/100ML
INJECTION, SOLUTION INTRAVENOUS
Status: DISCONTINUED
Start: 2023-02-20 | End: 2023-02-20 | Stop reason: HOSPADM

## 2023-02-20 RX ORDER — HEPARIN SODIUM (PORCINE) LOCK FLUSH IV SOLN 100 UNIT/ML 100 UNIT/ML
SOLUTION INTRAVENOUS
Status: COMPLETED
Start: 2023-02-20 | End: 2023-02-20

## 2023-02-20 RX ORDER — NALOXONE HYDROCHLORIDE 0.4 MG/ML
0.4 INJECTION, SOLUTION INTRAMUSCULAR; INTRAVENOUS; SUBCUTANEOUS
Status: DISCONTINUED | OUTPATIENT
Start: 2023-02-20 | End: 2023-02-20 | Stop reason: HOSPADM

## 2023-02-20 RX ORDER — NALOXONE HYDROCHLORIDE 0.4 MG/ML
0.2 INJECTION, SOLUTION INTRAMUSCULAR; INTRAVENOUS; SUBCUTANEOUS
Status: DISCONTINUED | OUTPATIENT
Start: 2023-02-20 | End: 2023-02-20 | Stop reason: HOSPADM

## 2023-02-20 RX ORDER — ONDANSETRON 2 MG/ML
INJECTION INTRAMUSCULAR; INTRAVENOUS
Status: COMPLETED
Start: 2023-02-20 | End: 2023-02-20

## 2023-02-20 RX ORDER — FENTANYL CITRATE 50 UG/ML
25-50 INJECTION, SOLUTION INTRAMUSCULAR; INTRAVENOUS EVERY 5 MIN PRN
Status: DISCONTINUED | OUTPATIENT
Start: 2023-02-20 | End: 2023-02-20 | Stop reason: HOSPADM

## 2023-02-20 RX ORDER — ONDANSETRON 2 MG/ML
4 INJECTION INTRAMUSCULAR; INTRAVENOUS ONCE
Status: DISCONTINUED | OUTPATIENT
Start: 2023-02-20 | End: 2023-02-20 | Stop reason: HOSPADM

## 2023-02-20 RX ORDER — LIDOCAINE HYDROCHLORIDE 10 MG/ML
INJECTION, SOLUTION EPIDURAL; INFILTRATION; INTRACAUDAL; PERINEURAL
Status: DISCONTINUED
Start: 2023-02-20 | End: 2023-02-20 | Stop reason: HOSPADM

## 2023-02-20 RX ORDER — LIDOCAINE HYDROCHLORIDE AND EPINEPHRINE 10; 10 MG/ML; UG/ML
INJECTION, SOLUTION INFILTRATION; PERINEURAL
Status: COMPLETED
Start: 2023-02-20 | End: 2023-02-20

## 2023-02-20 RX ORDER — FLUMAZENIL 0.1 MG/ML
0.2 INJECTION, SOLUTION INTRAVENOUS
Status: DISCONTINUED | OUTPATIENT
Start: 2023-02-20 | End: 2023-02-20 | Stop reason: HOSPADM

## 2023-02-20 RX ORDER — CEFAZOLIN SODIUM 2 G/100ML
2 INJECTION, SOLUTION INTRAVENOUS
Status: COMPLETED | OUTPATIENT
Start: 2023-02-20 | End: 2023-02-20

## 2023-02-20 RX ORDER — FENTANYL CITRATE 50 UG/ML
INJECTION, SOLUTION INTRAMUSCULAR; INTRAVENOUS
Status: COMPLETED
Start: 2023-02-20 | End: 2023-02-20

## 2023-02-20 RX ADMIN — HEPARIN SODIUM (PORCINE) LOCK FLUSH IV SOLN 100 UNIT/ML 500 UNITS: 100 SOLUTION at 10:27

## 2023-02-20 RX ADMIN — CEFAZOLIN SODIUM 2 G: 2 INJECTION, SOLUTION INTRAVENOUS at 10:09

## 2023-02-20 RX ADMIN — MIDAZOLAM 1 MG: 1 INJECTION INTRAMUSCULAR; INTRAVENOUS at 10:21

## 2023-02-20 RX ADMIN — LIDOCAINE HYDROCHLORIDE 20 ML: 10 INJECTION, SOLUTION EPIDURAL; INFILTRATION; INTRACAUDAL; PERINEURAL at 10:30

## 2023-02-20 RX ADMIN — LIDOCAINE HYDROCHLORIDE,EPINEPHRINE BITARTRATE 10 ML: 10; .01 INJECTION, SOLUTION INFILTRATION; PERINEURAL at 10:20

## 2023-02-20 RX ADMIN — FENTANYL CITRATE 100 MCG: 50 INJECTION INTRAMUSCULAR; INTRAVENOUS at 10:21

## 2023-02-20 RX ADMIN — MIDAZOLAM 1 MG: 1 INJECTION INTRAMUSCULAR; INTRAVENOUS at 10:15

## 2023-02-20 RX ADMIN — ONDANSETRON 4 MG: 2 INJECTION INTRAMUSCULAR; INTRAVENOUS at 10:10

## 2023-02-20 ASSESSMENT — ACTIVITIES OF DAILY LIVING (ADL)
ADLS_ACUITY_SCORE: 35
ADLS_ACUITY_SCORE: 35

## 2023-02-20 NOTE — DISCHARGE INSTRUCTIONS
Going Home after Your Port Is Inserted  _______________________________________    Patient Name: Isidra Tomas  Today's Date: February 20, 2023    The doctor who inserted your port was:  Claribel at [facility])      Have your port site and/or neck wound checked on: at your next visit or as directed by the Dr.      Go to:  Interventional Radiology    Your port may be accessed right away. A nurse needs to flush your port every 30 days or after each use.     When you get home:  You should have an adult with you for the first 6 hours.  No driving or drinking alcohol until tomorrow. You may still have side effects from the medicine you received today (you may feel drowsy, unsteady or forgetful).   You may go back to your regular diet today.   If you take aspirin or Plavix, you may begin taking it again tomorrow. You may restart all other medicines today. Use pain medicine as directed.  Avoid heavy lifting or the overuse of your shoulder for three days.  Caring for the port site and/or neck wound:  Keep the port site clean and dry. Cover the site with plastic before taking a shower. Do this until the site has healed.   Keep the bandage on your port site for three days. Change it if it gets wet or dirty. After three days, you may use Band-Aids until the wound has healed.  For a neck wound, leave the tape on for three days. You may cover it with a Band-Aid for comfort.   If you have oozing or bleeding from the port site or from the cut in your neck:   Put direct pressure on the wound for 5 to 10 minutes with a gauze pad.  If you still have bleeding after 10 minutes, call your doctor.  If you are bleeding a lot and can't control it with direct pressure, call 911.    Call your doctor if you have:  Bleeding from a wound after 10 minutes of direct pressure.  Swelling in your neck or over your port site.  Signs of infection: a fever over 100 F (37.8 C) under the tongue; the site is red, tender or  draining.

## 2023-02-20 NOTE — IP AVS SNAPSHOT
After Visit Summary Template Not Found    This Print Group is only intended to be used in the After Visit Summary and can only be used in a report that uses a released After Visit Summary Template.                       MRN:[id number]                      After Visit Summary   2/20/2023    Isidra Tomas   MRN: [id number]           Visit Information        Department      2/20/2023  8:50 AM [facility] Interventional Radiology          Review of your medicines      UNREVIEWED medicines. Ask your doctor about these medicines       Dose / Directions   levothyroxine 112 MCG tablet  Commonly known as: SYNTHROID/LEVOTHROID  Used for: Acquired hypothyroidism      TAKE 1 TABLET BY MOUTH  DAILY  Quantity: 90 tablet  Refills: 3     multivitamin Tabs tablet      Dose: 1 tablet  Take 1 tablet by mouth daily  Refills: 0     One-A-Day Womens Formula Tabs      Dose: 1 tablet  Take 1 tablet by mouth daily.  Refills: 0     vitamin D 1000 units capsule      Dose: 1 capsule  Take 1 capsule by mouth daily.  Refills: 0              Protect others around you: Learn how to safely use, store and throw away your medicines at www.disposemymeds.org.       Follow-ups after your visit       Your next 10 appointments already scheduled    Feb 20, 2023 10:00 AM  (Arrive by 9:00 AM)  IR CHEST PORT PLACEMENT > 5 YRS OF AGE with RHIR11, SHAMIKA, RH IR RAD  [facility] Interventional Radiology ([facility] ) [address] [facility] [id number] [phone]   How do I prepare for my exam? (Food and drink instructions)  Adults and Children over 2 years old: No eating for 8 hours before your procedure. You may have clear liquids up until 2 hours beforehand. These include water, clear tea, black coffee and fruit juice without pulp.  Children under 2 years old:  No eating or formula for 6 hours before your procedure. You may have clear liquids up until 2 hours beforehand. No breast milk for 4  hours before your procedure.    How do I prepare for my exam? (Other instructions)  We will call you to talk about your procedure and answer your questions. We will tell you what time to arrive. We will also ask about any medicines you are taking.  You will need to have a history and physical within 30 days before this procedure.    What should I wear: Please wear loose clothing, such as a sweat suit or jogging clothes. You will be asked to undress and put on a hospital gown.    How long does the exam take: You should expect to be at the facility for approximately 4 hours    What should I bring: Please bring any scans or X-rays taken at other hospitals, if similar tests were done. Also bring a list of your medicines, including vitamins, minerals and over-the-counter drugs. It is safest to leave personal items at home. You may be accompanied to your appointment by two family members.  If you bring a small child, an adult must be there to care for them during your exam.    Do I need a :  Yes, you may not drive or take a bus or taxi by yourself. You will need an adult to take you home. It is recommended that a responsible adult remain with you for 6 hours.    What do I need to tell my doctor:  Tell your doctor if:  * You have ever had an allergic reaction to X-ray dye (contrast fluid).  * If there's any chance you are pregnant.    What should I do after the exam:  Rest and do quiet activities for 24 hours. Avoid any heavy activity (lifting, vacuuming, exercise) on the day of the exam.  Do not make any major decisions and ensure you have a responsible adult with you for the remainder of the day.   Do not drive or use dangerous machines or tools for 24 hours.  Eat small, frequent meals to prevent nausea and vomiting.   Drink liquids as directed. Do not drink alcohol or take medicines that make you drowsy.  You should be able to return to your everyday activities the next day.    Who should I call with questions: If  you have any questions, please call the Imaging Department where you will have your exam. Directions, parking instructions, and other information are available on our website, [email or web address]/imaging.        Care Instructions       Further instructions from your care team         Going Home after Your Port Is Inserted  _______________________________________    Patient Name: Isidra Tomas  Today's Date: February 20, 2023    The doctor who inserted your port was:  Claribel at [facility])      Have your port site and/or neck wound checked on: at your next visit or as directed by the Dr.      Go to:  Interventional Radiology    Your port may be accessed right away. A nurse needs to flush your port every 30 days or after each use.     When you get home:  You should have an adult with you for the first 6 hours.  No driving or drinking alcohol until tomorrow. You may still have side effects from the medicine you received today (you may feel drowsy, unsteady or forgetful).   You may go back to your regular diet today.   If you take aspirin or Plavix, you may begin taking it again tomorrow. You may restart all other medicines today. Use pain medicine as directed.  Avoid heavy lifting or the overuse of your shoulder for three days.  Caring for the port site and/or neck wound:  Keep the port site clean and dry. Cover the site with plastic before taking a shower. Do this until the site has healed.   Keep the bandage on your port site for three days. Change it if it gets wet or dirty. After three days, you may use Band-Aids until the wound has healed.  For a neck wound, leave the tape on for three days. You may cover it with a Band-Aid for comfort.   If you have oozing or bleeding from the port site or from the cut in your neck:   Put direct pressure on the wound for 5 to 10 minutes with a gauze pad.  If you still have bleeding after 10 minutes, call your doctor.  If you are bleeding a lot and can't control it with  direct pressure, call 911.    Call your doctor if you have:  Bleeding from a wound after 10 minutes of direct pressure.  Swelling in your neck or over your port site.  Signs of infection: a fever over 100 F (37.8 C) under the tongue; the site is red, tender or draining.            Additional Information About Your Visit       MyChart Information    [employer] gives you secure access to your electronic health record. If you see a primary care provider, you can also send messages to your care team and make appointments. If you have questions, please call your primary care clinic.  If you do not have a primary care provider, please call [phone] and they will assist you.       Care EveryWhere ID    This is your Care EveryWhere ID. This could be used by other organizations to access your [facility] medical records  [id number]       Your Vitals Were  Most recent update: 2/20/2023  9:08 AM    Blood Pressure   138/76          Pulse   73          Temperature   98  F (36.7  C) (Temporal)          Respirations   18          Last Period   03/20/2007             Pulse Oximetry   97%          Primary Care Provider  Bruce Thompson PA-C Office Phone #  [phone] Fax #  [phone]      Equal Access to Services    ANJALI BAUER AH: Hadii aad ku hadasho Soomaali, waaxda luqadaha, qaybta kaalmada adeegyada, perla hernandez. So [facility] [phone].    ATENCIÓN: Si habla español, tiene a lozoya disposición servicios gratuitos de asistencia lingüística. Llame al [phone].    We comply with applicable federal and state civil rights laws, including the [place] Human Rights Act. We do not discriminate on the basis of race, color, creed, Christianity, national origin, marital status, age, disability, sex, sexual orientation, or gender identity.    If you would like an itemization of your charges they will now be available in [facility] 30 days after discharge. To access the itemized statements in [facility] go to  billing/billing summary. From there select view account. There will be multiple tabs showing an overview of your account, detail, payments, and communications. From the communications tab you can see your monthly statements, your itemized statements, and any billing letters generated for your account. If you do not have a [employer] account and need help getting access please contact [employer] at [phone].  If you would prefer to have your itemized statements mailed please contact our automated itemized bill request line at [phone] option  2.       Thank you!    Thank you for choosing [facility] for your care. Our goal is always to provide you with excellent care. Hearing back from our patients is one way we can continue to improve our services. Please take a few minutes to complete the written survey that you may receive in the mail after you visit. If you would like to speak to someone directly about your visit please contact Patient Relations at [phone]. Thank you!              Medication List      ASK your doctor about these medications          Morning Afternoon Evening Bedtime As Needed    levothyroxine 112 MCG tablet  Also known as: SYNTHROID/LEVOTHROID  INSTRUCTIONS: TAKE 1 TABLET BY MOUTH  DAILY                     multivitamin Tabs tablet  INSTRUCTIONS: Take 1 tablet by mouth daily                     One-A-Day Womens Formula Tabs  INSTRUCTIONS: Take 1 tablet by mouth daily.                     vitamin D 1000 units capsule  INSTRUCTIONS: Take 1 capsule by mouth daily.

## 2023-02-20 NOTE — PLAN OF CARE
A&O x 4. Dressing CDI. VSS. Tolerated PO. Denies pain. PIV removed. Belongings returned to pt  Pt received and verbalized understanding of discharge insructions  Pt stable and ready for discharge  Pt taking out via hospital w/c-  providing transport

## 2023-02-20 NOTE — PROGRESS NOTES
POST PROCEDURE NOTE    Procedure Performed:  Port Placement- Right Chest Wall  Physician performing the procedure was Marquis Sierra  Conscious sedation was administered with continuous monitoring of vital signs. See procedure log.       Versed: 2 mg  Fentanyl: 100 mcg  Antibiotics: Ancef 2 g  Zofran: 4 mg      This patient was monitored for 15 minutes.

## 2023-02-20 NOTE — IP AVS SNAPSHOT
[facility] Interventional Radiology  [address]  [facility] [address]  Phone: [phone]  Fax: [phone]                                    After Visit Summary   2/20/2023    Isidra Tomas   MRN: [id number]           After Visit Summary Signature Page    I have received my discharge instructions, and my questions have been answered. I have discussed any challenges I see with this plan with the nurse or doctor.    ..........................................................................................................................................  Patient/Patient Representative Signature      ..........................................................................................................................................  Patient Representative Print Name and Relationship to Patient    ..................................................               ................................................  Date                                   Time    ..........................................................................................................................................  Reviewed by Signature/Title    ...................................................              ..............................................  Date                                               Time          22EPIC Rev 08/18

## 2023-03-14 ENCOUNTER — TRANSFERRED RECORDS (OUTPATIENT)
Dept: FAMILY MEDICINE | Facility: CLINIC | Age: 68
End: 2023-03-14

## 2023-03-27 ENCOUNTER — TRANSFERRED RECORDS (OUTPATIENT)
Dept: FAMILY MEDICINE | Facility: CLINIC | Age: 68
End: 2023-03-27

## 2023-04-21 ENCOUNTER — HOSPITAL ENCOUNTER (OUTPATIENT)
Facility: CLINIC | Age: 68
Discharge: HOME | End: 2023-04-21
Attending: INTERNAL MEDICINE | Admitting: INTERNAL MEDICINE
Payer: COMMERCIAL

## 2023-04-21 ENCOUNTER — APPOINTMENT (OUTPATIENT)
Dept: ULTRASOUND IMAGING | Facility: CLINIC | Age: 68
End: 2023-04-21
Attending: NURSE PRACTITIONER
Payer: COMMERCIAL

## 2023-04-21 VITALS
DIASTOLIC BLOOD PRESSURE: 74 MMHG | SYSTOLIC BLOOD PRESSURE: 139 MMHG | HEART RATE: 100 BPM | OXYGEN SATURATION: 96 % | TEMPERATURE: 97.2 F | RESPIRATION RATE: 16 BRPM

## 2023-04-21 DIAGNOSIS — C81.90 HODGKIN'S DISEASE (H): ICD-10-CM

## 2023-04-21 DIAGNOSIS — C85.90 NON HODGKIN'S LYMPHOMA (H): ICD-10-CM

## 2023-04-21 PROCEDURE — 76604 US EXAM CHEST: CPT

## 2023-04-21 RX ORDER — NALOXONE HYDROCHLORIDE 0.4 MG/ML
0.4 INJECTION, SOLUTION INTRAMUSCULAR; INTRAVENOUS; SUBCUTANEOUS
Status: DISCONTINUED | OUTPATIENT
Start: 2023-04-21 | End: 2023-04-21 | Stop reason: HOSPADM

## 2023-04-21 RX ORDER — NALOXONE HYDROCHLORIDE 0.4 MG/ML
0.2 INJECTION, SOLUTION INTRAMUSCULAR; INTRAVENOUS; SUBCUTANEOUS
Status: DISCONTINUED | OUTPATIENT
Start: 2023-04-21 | End: 2023-04-21 | Stop reason: HOSPADM

## 2023-04-21 RX ORDER — ACETAMINOPHEN 325 MG/1
650 TABLET ORAL
Status: DISCONTINUED | OUTPATIENT
Start: 2023-04-21 | End: 2023-04-21 | Stop reason: HOSPADM

## 2023-04-21 RX ORDER — FENTANYL CITRATE 50 UG/ML
25-50 INJECTION, SOLUTION INTRAMUSCULAR; INTRAVENOUS EVERY 5 MIN PRN
Status: DISCONTINUED | OUTPATIENT
Start: 2023-04-21 | End: 2023-04-21 | Stop reason: HOSPADM

## 2023-04-21 RX ORDER — FLUMAZENIL 0.1 MG/ML
0.2 INJECTION, SOLUTION INTRAVENOUS
Status: DISCONTINUED | OUTPATIENT
Start: 2023-04-21 | End: 2023-04-21 | Stop reason: HOSPADM

## 2023-04-21 ASSESSMENT — ACTIVITIES OF DAILY LIVING (ADL)
ADLS_ACUITY_SCORE: 35
ADLS_ACUITY_SCORE: 35

## 2023-04-21 NOTE — PROGRESS NOTES
Isidra Tomas is at [facility] today for a R port check, possible removal and replacement.     She was evaluated by Dr Jj and it was felt the patient was improved so no procedure today. The patient has been on Keflex already and is now on Doxycycline. Dr Jj requested an US of the port area and to extend Doxycycline for 4 more days for a 2 week prescription.     Discussed the patient with ANNA Massey at [facility] who had treated the patient and had her sent over. She felt that there was a small pus filled area lateral to the port incision which almost looked like an area where a stitch is under the skin. This area would improve with antibiotics. She will call in the 4 more days of Doxycycline.     Patient is scheduled to come back next week on Friday 4/28. Order entered.     US was done and results below    US CHEST WALL  4/21/2023 10:46 AM     HISTORY: Right chest wall report with induration and erythema over chamber, concern for underlying fluid collection     TECHNIQUE: Grayscale imaging of the right chest wall was performed.     FINDINGS: No evidence of fluid collection around the port or the tubing in the right chest wall.                                                                 IMPRESSION: No fluid collection around the port or tubing in the right chest wall.     OCTAVIO JJ MD     Total time: 20 minutes    Thanks, [facility] Interventional Radiology CNP ([phone]) (phone [phone])

## 2023-04-21 NOTE — PROGRESS NOTES
Provider assessed patient and completed ultrasound. No need for procedure/sedation today. Patient was instructed to take daily pictures of the port site and bring to her appointment next Friday, as well as finish 14 day course of antibiotics that have been prescribed by the provider and sent to [facility] in [place] for patient to . Patient ambulated out of hospital with spouse.

## 2023-04-21 NOTE — PROGRESS NOTES
IR    68 yo woman with lymphoma  who had a port placed on 2/20/23.  Port has been used without difficulty.  But patient states that she had had erythema over the port chamber, in the right chest wall, since placement.  Was placed on keflex, with some decrease in the erythema, which then returned.  Has been on doxycyline, and is currently on day 7/10.  States that erythema has decreased.  Denies revers/chills or drainage form the prot site.  No pain to palpation and no obvious fluid collection palpated around port.  limited chest US also shows no localized fluid collections      Discussed treatment alternative with patient, including removal of right port and placement of a left chest wall port, removal of port and placement of temporary PICC, with placement of right chest wall prot in a few weeks after right skin incision healed, or finally continued observation.    Patient states that she really wanted to keep the port if possible.  Since erythema has decreased, and since patient has no f/c, agreed to continued observation.      Have asked patient to continue antibiotics for a total of 14 days (additonal antibiotics doses called to her Pharmacy).  Also asked patient to take daily pictures of the prot site so that we can evaluate how skin is responding to the antibiotics.    Patient to return to [facility] for repeat port evaluation in a week.    She was instructed to call IR immediately if she notices progression in symptoms, or with any questions.    Elvira Montalvo MD  Pager   .

## 2023-04-24 ENCOUNTER — MYC MEDICAL ADVICE (OUTPATIENT)
Dept: INTERVENTIONAL RADIOLOGY/VASCULAR | Facility: CLINIC | Age: 68
End: 2023-04-24
Payer: COMMERCIAL

## 2023-05-12 ENCOUNTER — TRANSFERRED RECORDS (OUTPATIENT)
Dept: FAMILY MEDICINE | Facility: CLINIC | Age: 68
End: 2023-05-12

## 2023-05-26 ENCOUNTER — TRANSFERRED RECORDS (OUTPATIENT)
Dept: FAMILY MEDICINE | Facility: CLINIC | Age: 68
End: 2023-05-26

## 2023-06-07 ENCOUNTER — TRANSFERRED RECORDS (OUTPATIENT)
Dept: FAMILY MEDICINE | Facility: CLINIC | Age: 68
End: 2023-06-07

## 2023-06-11 ENCOUNTER — HEALTH MAINTENANCE LETTER (OUTPATIENT)
Age: 68
End: 2023-06-11

## 2023-06-21 ENCOUNTER — TRANSFERRED RECORDS (OUTPATIENT)
Dept: FAMILY MEDICINE | Facility: CLINIC | Age: 68
End: 2023-06-21

## 2023-07-12 ENCOUNTER — TRANSFERRED RECORDS (OUTPATIENT)
Dept: FAMILY MEDICINE | Facility: CLINIC | Age: 68
End: 2023-07-12

## 2023-07-20 ENCOUNTER — OFFICE VISIT (OUTPATIENT)
Dept: FAMILY MEDICINE | Facility: CLINIC | Age: 68
End: 2023-07-20

## 2023-07-20 VITALS
DIASTOLIC BLOOD PRESSURE: 72 MMHG | BODY MASS INDEX: 23.3 KG/M2 | WEIGHT: 145 LBS | HEART RATE: 79 BPM | TEMPERATURE: 97.3 F | OXYGEN SATURATION: 97 % | HEIGHT: 66 IN | SYSTOLIC BLOOD PRESSURE: 116 MMHG

## 2023-07-20 DIAGNOSIS — E03.9 ACQUIRED HYPOTHYROIDISM: Primary | ICD-10-CM

## 2023-07-20 DIAGNOSIS — C81.21: ICD-10-CM

## 2023-07-20 DIAGNOSIS — Z13.228 SCREENING FOR METABOLIC DISORDER: ICD-10-CM

## 2023-07-20 DIAGNOSIS — Z13.220 SCREENING FOR LIPID DISORDERS: ICD-10-CM

## 2023-07-20 LAB
BUN SERPL-MCNC: 10 MG/DL (ref 7–25)
BUN/CREATININE RATIO: 14.3 (ref 6–32)
CALCIUM SERPL-MCNC: 9.6 MG/DL (ref 8.6–10.3)
CHLORIDE SERPLBLD-SCNC: 105.5 MMOL/L (ref 98–110)
CHOLEST SERPL-MCNC: 219 MG/DL (ref 0–199)
CHOLEST/HDLC SERPL: 4 {RATIO} (ref 0–5)
CO2 SERPL-SCNC: 30.5 MMOL/L (ref 20–32)
CREAT SERPL-MCNC: 0.7 MG/DL (ref 0.6–1.3)
GLUCOSE SERPL-MCNC: 85 MG/DL (ref 60–99)
HDLC SERPL-MCNC: 57 MG/DL (ref 40–150)
LDLC SERPL CALC-MCNC: 135 MG/DL (ref 0–130)
POTASSIUM SERPL-SCNC: 4.31 MMOL/L (ref 3.5–5.3)
SODIUM SERPL-SCNC: 141.1 MMOL/L (ref 135–146)
TRIGL SERPL-MCNC: 134 MG/DL (ref 0–149)

## 2023-07-20 PROCEDURE — 99213 OFFICE O/P EST LOW 20 MIN: CPT | Performed by: PHYSICIAN ASSISTANT

## 2023-07-20 PROCEDURE — G0438 PPPS, INITIAL VISIT: HCPCS | Performed by: PHYSICIAN ASSISTANT

## 2023-07-20 PROCEDURE — 80048 BASIC METABOLIC PNL TOTAL CA: CPT | Performed by: PHYSICIAN ASSISTANT

## 2023-07-20 PROCEDURE — 36415 COLL VENOUS BLD VENIPUNCTURE: CPT | Performed by: PHYSICIAN ASSISTANT

## 2023-07-20 PROCEDURE — 80061 LIPID PANEL: CPT | Performed by: PHYSICIAN ASSISTANT

## 2023-07-20 RX ORDER — GABAPENTIN 100 MG/1
300 CAPSULE ORAL DAILY
COMMUNITY
End: 2024-06-26

## 2023-07-20 RX ORDER — LEVOTHYROXINE SODIUM 112 UG/1
TABLET ORAL
Qty: 90 TABLET | Refills: 3 | Status: SHIPPED | OUTPATIENT
Start: 2023-07-20 | End: 2024-07-11

## 2023-07-20 NOTE — NURSING NOTE
Chief Complaint   Patient presents with     Recheck Medication     Fasting med check, thyroid check     Wellness Visit     AWV         Pre-visit Screening:  Immunizations:  up to date  Colonoscopy:  is up to date  Mammogram: is up to date  Asthma Action Test/Plan:  RAYSHAWN  PHQ9:  NA phq2 done  GAD7:  NA  Questioned patient about current smoking habits Pt. has never smoked.  Ok to leave detailed message on voice mail for today's visit only Yes, phone # [phone]

## 2023-07-20 NOTE — PROGRESS NOTES
"CC: Medication Check    History:  Hypothyroid:  Stable on current dose of levothyroxine 112 mcg daily. Denies any symptoms of being over or under supplemented. Last thyroid blood tests 7/2022 and were normal without change. Takes medication first thing in the morning with glass of water 30-60 minutes before eating.     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 Has 2 more rounds of chemo Aug 7. Has had 2 PET scans, with 2nd showing no concerns. 3rd PET scan is scheduled for next month.     PMH, MEDICATIONS, ALLERGIES, SOCIAL AND FAMILY HISTORY in [facility] and reviewed by me personally.    ROS negative other than the symptoms noted above in the HPI.      Examination   /72 (BP Location: Right arm, Patient Position: Sitting, Cuff Size: Adult Regular)   Pulse 79   Temp 97.3  F (36.3  C) (Temporal)   Ht 1.664 m (5' 5.5\")   Wt 65.8 kg (145 lb)   LMP 03/20/2007   SpO2 97%   BMI 23.76 kg/m       Constitutional: Sitting comfortably, in no acute distress. Vital signs noted  Neck:  no adenopathy, trachea midline and normal to palpation, thyroid normal to palpation  Cardiovascular:  regular rate and rhythm, no murmurs, clicks, or gallops  Respiratory:  normal respiratory rate and rhythm, lungs clear to auscultation  SKIN: No jaundice/pallor/rash.   Psychiatric: mentation appears normal and affect normal/bright      A/P    ICD-10-CM    1. Acquired hypothyroidism  E03.9 levothyroxine (SYNTHROID/LEVOTHROID) 112 MCG tablet     VENOUS COLLECTION     TSH ([facility])     T4 FREE ([facility])      2. Mixed cellularity Hodgkin lymphoma, lymph nodes of head, face, and neck (H)  C81.21       3. Screening for lipid disorders  Z13.220 Lipid Panel ([facility])      4. Screening for metabolic disorder  Z13.228 Basic Metabolic Panel ([facility])          DISCUSSION:  Hypothyroid:  Will recheck TSH and refill current dose of levothyroxine for 1 year, unless dose change is indicated.     Hogdkin's disease:  Continue working " closely with oncologist.     General:  Will update fasting labs and send MyChart.     follow up visit: 1 year    Elaina Staples PA-C  [facility]

## 2023-07-20 NOTE — PROGRESS NOTES
Isidra Tomas is a 67 year old female who presents for Medicare Annual Wellness Visit.    Current providers caring for this patient include:  Patient Care Team:  Bruce Thompson PA-C as PCP - General (Family Medicine)  Higinio Gonzalez MD as Assigned PCP    Complete Medical and Social history reviewed with patient, outlined below.    Patient Active Problem List   Diagnosis     Lumbago     Family history of other cardiovascular diseases     ACP (advance care planning)     Health Care Home     Acquired hypothyroidism     Pure hypercholesterolemia       No past medical history on file.    Past Surgical History:   Procedure Laterality Date     COLONOSCOPY  2014    Normal/ repeat in 10 yrs     IR CHEST PORT PLACEMENT > 5 YRS OF AGE  2023     MALIGNANT SKIN LESION EXCISION Right 2019    melanoma right arm, [facility], Dr. Osvaldo SIMONZ COLONOSCOPY THRU STOMA, DIAGNOSTIC  2005    Normal/ [place] [facility]: repeat in 10 yrs       Family History   Problem Relation Age of Onset     Thyroid Disease Maternal Grandmother      Heart Disease Father          at age 47     Lipids Sister      Heart Disease Sister      Heart Disease Mother         valvular disease     Eye Disorder Mother         macular degeneration     C.A.D. Brother          69 yo, [place]     Rheumatoid Arthritis Sister        Social History     Tobacco Use     Smoking status: Never     Passive exposure: Past     Smokeless tobacco: Never   Substance Use Topics     Alcohol use: Yes     Alcohol/week: 1.7 standard drinks of alcohol     Types: 2 Standard drinks or equivalent per week     Comment: occasional, 2 oz per week       Diet: regular, low salt/low fat  Physical Activity: patient exercises 7 times weekly  Depression Screen:    Over the past 2 weeks, patient has felt down, depressed, or hopeless:  No    Over the past 2 weeks, patient has felt little interest or pleasure in doing things: No    Functional ability/Safety screen:  Up  "and go test (able to get up and walk longer than 30 seconds): Passed  Patient needs assistance with: nothing  Patient's home has the following possible safety concerns: none identified  Patient has concerns about her hearing:  No  Cognitive Screen  Patient repeats three objects (ball, flag, tree)      Clock drawing test:   NORMAL  Recalls three objects after 3 minutes (ball,flag,tree):                                                                                               recalls 3 objects (3 points)    Physical Exam:  /72 (BP Location: Right arm, Patient Position: Sitting, Cuff Size: Adult Regular)   Pulse 79   Temp 97.3  F (36.3  C) (Temporal)   Ht 1.664 m (5' 5.5\")   Wt 65.8 kg (145 lb)   LMP 03/20/2007   SpO2 97%   BMI 23.76 kg/m     Body mass index is 23.76 kg/m .     GENERAL APPEARANCE: healthy, alert and no distress  PSYCH: Affect normal/bright.  Mentation within normal limits.  EYES: conjunctiva clear  HENT: ear canals and TM's normal.  Nose and mouth without ulcers, erythema or lesions  NECK: supple, nontender, no lymphadenopathy  RESP: lungs clear to auscultation - no rales, rhonchi or wheezes  CV: regular rates and rhythm, normal S1 S2, no murmur noted  ABDOMEN:  soft, nontender, no HSM or masses and bowel sounds normal  SKIN: no suspicious lesions or rashes    End of Life Planning:   Patient currently has an advanced directive: Yes.  Practitioner is supportive of decision.    Education/Counseling:   Based on review of the above information, the following items were addressed:    Appropriate preventive services were discussed with this patient, including applicable screening as appropriate for cardiovascular disease, diabetes, osteopenia/osteoporosis, and glaucoma.  As appropriate for age/gender, discussed screening for colorectal cancer, prostate cancer, breast cancer, and cervical cancer.   Checklist reviewing preventive services available has been given to the patient.    Elaina" GILMA Staples  7/20/2023

## 2023-07-21 LAB
T4, FREE, NON-DIALYSIS - QUEST: 1.3 NG/DL (ref 0.8–1.8)
TSH SERPL-ACNC: 4.47 MIU/L (ref 0.4–4.5)

## 2023-07-27 ENCOUNTER — TRANSFERRED RECORDS (OUTPATIENT)
Dept: FAMILY MEDICINE | Facility: CLINIC | Age: 68
End: 2023-07-27

## 2023-08-21 ENCOUNTER — TRANSFERRED RECORDS (OUTPATIENT)
Dept: HEALTH INFORMATION MANAGEMENT | Facility: CLINIC | Age: 68
End: 2023-08-21

## 2023-08-28 ENCOUNTER — TRANSFERRED RECORDS (OUTPATIENT)
Dept: FAMILY MEDICINE | Facility: CLINIC | Age: 68
End: 2023-08-28

## 2023-08-30 ENCOUNTER — MYC MEDICAL ADVICE (OUTPATIENT)
Dept: INTERVENTIONAL RADIOLOGY/VASCULAR | Facility: CLINIC | Age: 68
End: 2023-08-30

## 2023-08-30 RX ORDER — ACETAMINOPHEN 325 MG/1
650 TABLET ORAL
Status: CANCELLED | OUTPATIENT
Start: 2023-08-30

## 2023-09-06 ENCOUNTER — APPOINTMENT (OUTPATIENT)
Dept: INTERVENTIONAL RADIOLOGY/VASCULAR | Facility: CLINIC | Age: 68
End: 2023-09-06
Attending: INTERNAL MEDICINE
Payer: COMMERCIAL

## 2023-09-06 ENCOUNTER — HOSPITAL ENCOUNTER (OUTPATIENT)
Facility: CLINIC | Age: 68
Discharge: HOME | End: 2023-09-06
Attending: RADIOLOGY | Admitting: RADIOLOGY
Payer: COMMERCIAL

## 2023-09-06 VITALS
HEART RATE: 66 BPM | DIASTOLIC BLOOD PRESSURE: 73 MMHG | OXYGEN SATURATION: 96 % | SYSTOLIC BLOOD PRESSURE: 121 MMHG | RESPIRATION RATE: 16 BRPM | TEMPERATURE: 97.7 F

## 2023-09-06 DIAGNOSIS — C81.19 HODGKIN'S DISEASE, NODULAR SCLEROSIS OF EXTRANODAL OR SOLID ORGAN SITE (H): ICD-10-CM

## 2023-09-06 LAB
ERYTHROCYTE [DISTWIDTH] IN BLOOD BY AUTOMATED COUNT: 16.5 % (ref 10–15)
HCT VFR BLD AUTO: 37.8 % (ref 35–47)
HGB BLD-MCNC: 11.9 G/DL (ref 11.7–15.7)
MCH RBC QN AUTO: 29.8 PG (ref 26.5–33)
MCHC RBC AUTO-ENTMCNC: 31.5 G/DL (ref 31.5–36.5)
MCV RBC AUTO: 95 FL (ref 78–100)
PLATELET # BLD AUTO: 297 10E3/UL (ref 150–450)
RBC # BLD AUTO: 4 10E6/UL (ref 3.8–5.2)
WBC # BLD AUTO: 5.3 10E3/UL (ref 4–11)

## 2023-09-06 PROCEDURE — 36590 REMOVAL TUNNELED CV CATH: CPT

## 2023-09-06 PROCEDURE — 99152 MOD SED SAME PHYS/QHP 5/>YRS: CPT

## 2023-09-06 PROCEDURE — 250N000009 HC RX 250

## 2023-09-06 PROCEDURE — 36415 COLL VENOUS BLD VENIPUNCTURE: CPT | Performed by: NURSE PRACTITIONER

## 2023-09-06 PROCEDURE — 250N000011 HC RX IP 250 OP 636: Mod: JZ

## 2023-09-06 PROCEDURE — 85014 HEMATOCRIT: CPT | Performed by: NURSE PRACTITIONER

## 2023-09-06 RX ORDER — NALOXONE HYDROCHLORIDE 0.4 MG/ML
0.2 INJECTION, SOLUTION INTRAMUSCULAR; INTRAVENOUS; SUBCUTANEOUS
Status: DISCONTINUED | OUTPATIENT
Start: 2023-09-06 | End: 2023-09-06 | Stop reason: HOSPADM

## 2023-09-06 RX ORDER — NALOXONE HYDROCHLORIDE 0.4 MG/ML
0.4 INJECTION, SOLUTION INTRAMUSCULAR; INTRAVENOUS; SUBCUTANEOUS
Status: DISCONTINUED | OUTPATIENT
Start: 2023-09-06 | End: 2023-09-06 | Stop reason: HOSPADM

## 2023-09-06 RX ORDER — NALOXONE HYDROCHLORIDE 0.4 MG/ML
0.2 INJECTION, SOLUTION INTRAMUSCULAR; INTRAVENOUS; SUBCUTANEOUS
Status: DISCONTINUED | OUTPATIENT
Start: 2023-09-06 | End: 2023-09-06

## 2023-09-06 RX ORDER — FENTANYL CITRATE 50 UG/ML
25-50 INJECTION, SOLUTION INTRAMUSCULAR; INTRAVENOUS EVERY 5 MIN PRN
Status: DISCONTINUED | OUTPATIENT
Start: 2023-09-06 | End: 2023-09-06 | Stop reason: HOSPADM

## 2023-09-06 RX ORDER — ONDANSETRON 2 MG/ML
INJECTION INTRAMUSCULAR; INTRAVENOUS
Status: COMPLETED
Start: 2023-09-06 | End: 2023-09-06

## 2023-09-06 RX ORDER — NALOXONE HYDROCHLORIDE 0.4 MG/ML
0.4 INJECTION, SOLUTION INTRAMUSCULAR; INTRAVENOUS; SUBCUTANEOUS
Status: DISCONTINUED | OUTPATIENT
Start: 2023-09-06 | End: 2023-09-06

## 2023-09-06 RX ORDER — ONDANSETRON 2 MG/ML
4 INJECTION INTRAMUSCULAR; INTRAVENOUS
Status: COMPLETED | OUTPATIENT
Start: 2023-09-06 | End: 2023-09-06

## 2023-09-06 RX ORDER — FLUMAZENIL 0.1 MG/ML
0.2 INJECTION, SOLUTION INTRAVENOUS
Status: DISCONTINUED | OUTPATIENT
Start: 2023-09-06 | End: 2023-09-06 | Stop reason: HOSPADM

## 2023-09-06 RX ORDER — FENTANYL CITRATE 50 UG/ML
INJECTION, SOLUTION INTRAMUSCULAR; INTRAVENOUS
Status: COMPLETED
Start: 2023-09-06 | End: 2023-09-06

## 2023-09-06 RX ORDER — LIDOCAINE HYDROCHLORIDE 10 MG/ML
INJECTION, SOLUTION EPIDURAL; INFILTRATION; INTRACAUDAL; PERINEURAL
Status: COMPLETED
Start: 2023-09-06 | End: 2023-09-06

## 2023-09-06 RX ADMIN — MIDAZOLAM 0.5 MG: 1 INJECTION INTRAMUSCULAR; INTRAVENOUS at 08:45

## 2023-09-06 RX ADMIN — FENTANYL CITRATE 50 MCG: 0.05 INJECTION, SOLUTION INTRAMUSCULAR; INTRAVENOUS at 08:46

## 2023-09-06 RX ADMIN — ONDANSETRON 4 MG: 2 INJECTION INTRAMUSCULAR; INTRAVENOUS at 08:44

## 2023-09-06 RX ADMIN — LIDOCAINE HYDROCHLORIDE 10 ML: 10 INJECTION, SOLUTION EPIDURAL; INFILTRATION; INTRACAUDAL; PERINEURAL at 08:46

## 2023-09-06 RX ADMIN — FENTANYL CITRATE 50 MCG: 50 INJECTION, SOLUTION INTRAMUSCULAR; INTRAVENOUS at 08:46

## 2023-09-06 ASSESSMENT — ACTIVITIES OF DAILY LIVING (ADL): ADLS_ACUITY_SCORE: 35

## 2023-09-06 NOTE — IP AVS SNAPSHOT
After Visit Summary Template Not Found    This Print Group is only intended to be used in the After Visit Summary and can only be used in a report that uses a released After Visit Summary Template.                       MRN:[id number]                      After Visit Summary   9/6/2023    Isidra Tomas   MRN: [id number]           Visit Information        Department      9/6/2023  7:56 AM [facility] Interventional Radiology          Review of your medicines      UNREVIEWED medicines. Ask your doctor about these medicines       Dose / Directions   gabapentin 100 MG capsule  Commonly known as: NEURONTIN      Dose: 300 mg  Take 300 mg by mouth daily 100 mg in the AM  200 mg in the PM  Refills: 0     levothyroxine 112 MCG tablet  Commonly known as: SYNTHROID/LEVOTHROID  Used for: Acquired hypothyroidism      TAKE 1 TABLET BY MOUTH  DAILY  Quantity: 90 tablet  Refills: 3     vitamin D 1000 units capsule      Dose: 1 capsule  Take 1 capsule by mouth daily.  Refills: 0              Protect others around you: Learn how to safely use, store and throw away your medicines at www.disposemymeds.org.       Follow-ups after your visit       Care Instructions       Further instructions from your care team         Going Home after the   Removal of Your Port or Tunneled Dialysis Catheter  ______________________________________________________________________    Patient Name:  Isidra Tomas  Today's Date:  September 6, 2023    The doctor who removed your port or catheter was: Dr. Witt  at [facility] in:    Interventional Radiology Department  When you get home:  No driving or drinking alcohol until tomorrow. You may still have side effects from   the medicine you received today (you may feel drowsy, unsteady or forgetful).  You should have an adult with you for the first 6 hours at home (radiology patients).  You may go back to your regular diet today.   If you take aspirin or Plavix, you may begin  taking it again tomorrow. You may restart all other medicines today. Use pain medicine as directed.  Avoid heavy lifting or the overuse of your shoulder for three days.    Port site and bandage care  Keep the wound clean and dry for three days. Cover it with plastic before taking a shower.   Change the bandage if it gets wet or dirty. Use bacitracin (antibiotic cream) and clean gauze.   After three days, you may use Band-Aids until the wound has healed.  If you have oozing or bleeding from the port site or catheter (tube) site:   Put direct pressure on the wound for 5 to 10 minutes with a gauze pad.  If you still have bleeding after 10 minutes, call your doctor.  If you are bleeding a lot and can t control it with direct pressure, call 911.    Call your doctor if you have:  Swelling in your neck.  Signs of infection:   fever over 100  F (37.8 C) under the tongue  the wound is red, tender or draining.    Additional Information About Your Visit       [employer] Information    "[employer] gives you secure access to your electronic health record. If you see a primary care provider, you can also send messages to your care team and make appointments. If you have questions, please call your primary care clinic.  If you do not have a primary care provider, please call [phone] and they will assist you.       Care EveryWhere ID    This is your Care EveryWhere ID. This could be used by other organizations to access your [facility] medical records  [id number]       Your Vitals Were  Most recent update: 9/6/2023  8:55 AM    Blood Pressure   103/59          Pulse   60          Temperature   97.7  F (36.5  C) (Temporal)          Respirations   22          Last Period   03/20/2007             Pulse Oximetry   98%          Primary Care Provider  Bruce Thompson PA-C Office Phone #  [phone] Fax #  [phone]      Equal Access to Services    SEUN BAUER AH: Alvarez Sanchez, maira bright, barbara watson  perla johnsonnarcisa barneyaan ah. [facility] [phone].    ATENCIÓN: Si donaldola brad, tiene a lozoya disposición servicios gratuitos de asistencia lingüística. Marcy al [phone].    We comply with applicable federal and state civil rights laws, including the [place] Human Rights Act. We do not discriminate on the basis of race, color, creed, Confucianist, national origin, marital status, age, disability, sex, sexual orientation, or gender identity.    If you would like an itemization of your charges they will now be available in [employer] 30 days after discharge. To access the itemized statements in [employer] go to billing/billing summary. From there select view account. There will be multiple tabs showing an overview of your account, detail, payments, and communications. From the communications tab you can see your monthly statements, your itemized statements, and any billing letters generated for your account. If you do not have a [employer] account and need help getting access please contact [employer] support at [phone].  If you would prefer to have your itemized statements mailed please contact our automated itemized bill request line at [phone] option  2.       Thank you!    Thank you for choosing [facility] for your care. Our goal is always to provide you with excellent care. Hearing back from our patients is one way we can continue to improve our services. Please take a few minutes to complete the written survey that you may receive in the mail after you visit. If you would like to speak to someone directly about your visit please contact Patient Relations at [phone]. Thank you!              Medication List      ASK your doctor about these medications          Morning Afternoon Evening Bedtime As Needed    gabapentin 100 MG capsule  Also known as: NEURONTIN  INSTRUCTIONS: Take 300 mg by mouth daily 100 mg in the AM  200 mg in the PM                     levothyroxine 112  MCG tablet  Also known as: SYNTHROID/LEVOTHROID  INSTRUCTIONS: TAKE 1 TABLET BY MOUTH  DAILY                     vitamin D 1000 units capsule  INSTRUCTIONS: Take 1 capsule by mouth daily.

## 2023-09-06 NOTE — DISCHARGE INSTRUCTIONS
Going Home after the   Removal of Your Port or Tunneled Dialysis Catheter  ______________________________________________________________________    Patient Name:  Isidra Tomas  Today's Date:  September 6, 2023    The doctor who removed your port or catheter was: Dr. Witt  at [facility] in:    Interventional Radiology Department  When you get home:  No driving or drinking alcohol until tomorrow. You may still have side effects from   the medicine you received today (you may feel drowsy, unsteady or forgetful).  You should have an adult with you for the first 6 hours at home (radiology patients).  You may go back to your regular diet today.   If you take aspirin or Plavix, you may begin taking it again tomorrow. You may restart all other medicines today. Use pain medicine as directed.  Avoid heavy lifting or the overuse of your shoulder for three days.    Port site and bandage care  Keep the wound clean and dry for three days. Cover it with plastic before taking a shower.   Change the bandage if it gets wet or dirty. Use bacitracin (antibiotic cream) and clean gauze.   After three days, you may use Band-Aids until the wound has healed.  If you have oozing or bleeding from the port site or catheter (tube) site:   Put direct pressure on the wound for 5 to 10 minutes with a gauze pad.  If you still have bleeding after 10 minutes, call your doctor.  If you are bleeding a lot and can t control it with direct pressure, call 911.    Call your doctor if you have:  Swelling in your neck.  Signs of infection:   fever over 100  F (37.8 C) under the tongue  the wound is red, tender or draining.

## 2023-09-06 NOTE — IP AVS SNAPSHOT
[facility] Interventional Radiology  [address]  [facility] [address]  Phone: [phone]  Fax: [phone]                              After Visit Summary   9/6/2023    Isidra Tomas   MRN: [id number]           After Visit Summary Signature Page    I have received my discharge instructions, and my questions have been answered. I have discussed any challenges I see with this plan with the nurse or doctor.    ..........................................................................................................................................  Patient/Patient Representative Signature      ..........................................................................................................................................  Patient Representative Print Name and Relationship to Patient    ..................................................               ................................................  Date                                   Time    ..........................................................................................................................................  Reviewed by Signature/Title    ...................................................              ..............................................  Date                                               Time    22EPIC Rev 08/18

## 2023-09-06 NOTE — IR NOTE
Discharge instructions given, iv removed. Patient tolerated cookies and coffee well. Site c,d,I. Patient to walk to vehicle.

## 2023-09-08 ENCOUNTER — TELEPHONE (OUTPATIENT)
Dept: INTERVENTIONAL RADIOLOGY/VASCULAR | Facility: CLINIC | Age: 68
End: 2023-09-08

## 2023-09-08 NOTE — TELEPHONE ENCOUNTER
Spoke with: Patient  Procedure done: 9/6 port removal  Any pain: No  Any fever: No  Any redness/swelling/abnormal drainage around puncture site: No  Were you instructed well enough to take care of yourself at home: Yes  Are you satisfied with the care you received: Yes  Any additional concerns or questions: No      Post call completed.   September 8, 2023 10:49 AM  Idalmis Acosta RN  Interventional Radiology  [phone]

## 2023-09-18 ENCOUNTER — TRANSFERRED RECORDS (OUTPATIENT)
Dept: FAMILY MEDICINE | Facility: CLINIC | Age: 68
End: 2023-09-18

## 2023-11-27 ENCOUNTER — TRANSFERRED RECORDS (OUTPATIENT)
Dept: FAMILY MEDICINE | Facility: CLINIC | Age: 68
End: 2023-11-27

## 2024-03-14 ENCOUNTER — TRANSFERRED RECORDS (OUTPATIENT)
Dept: FAMILY MEDICINE | Facility: CLINIC | Age: 69
End: 2024-03-14

## 2024-05-22 ENCOUNTER — OFFICE VISIT (OUTPATIENT)
Dept: URGENT CARE | Facility: URGENT CARE | Age: 69
End: 2024-05-22
Payer: COMMERCIAL

## 2024-05-22 VITALS
OXYGEN SATURATION: 99 % | BODY MASS INDEX: 24.59 KG/M2 | SYSTOLIC BLOOD PRESSURE: 118 MMHG | RESPIRATION RATE: 16 BRPM | DIASTOLIC BLOOD PRESSURE: 78 MMHG | HEART RATE: 86 BPM | WEIGHT: 153 LBS | HEIGHT: 66 IN | TEMPERATURE: 97.7 F

## 2024-05-22 DIAGNOSIS — N30.00 ACUTE CYSTITIS WITHOUT HEMATURIA: Primary | ICD-10-CM

## 2024-05-22 DIAGNOSIS — R39.89 URINARY PROBLEM: ICD-10-CM

## 2024-05-22 LAB
ALBUMIN UR-MCNC: NEGATIVE MG/DL
APPEARANCE UR: CLEAR
BACTERIA #/AREA URNS HPF: ABNORMAL /HPF
BILIRUB UR QL STRIP: NEGATIVE
COLOR UR AUTO: YELLOW
GLUCOSE UR STRIP-MCNC: NEGATIVE MG/DL
HGB UR QL STRIP: ABNORMAL
KETONES UR STRIP-MCNC: NEGATIVE MG/DL
LEUKOCYTE ESTERASE UR QL STRIP: ABNORMAL
NITRATE UR QL: NEGATIVE
PH UR STRIP: 6 [PH] (ref 5–7)
RBC #/AREA URNS AUTO: ABNORMAL /HPF
SP GR UR STRIP: <=1.005 (ref 1–1.03)
SQUAMOUS #/AREA URNS AUTO: ABNORMAL /LPF
UROBILINOGEN UR STRIP-ACNC: 0.2 E.U./DL
WBC #/AREA URNS AUTO: ABNORMAL /HPF

## 2024-05-22 PROCEDURE — 81001 URINALYSIS AUTO W/SCOPE: CPT

## 2024-05-22 PROCEDURE — 87086 URINE CULTURE/COLONY COUNT: CPT | Performed by: PHYSICIAN ASSISTANT

## 2024-05-22 PROCEDURE — 87186 SC STD MICRODIL/AGAR DIL: CPT | Performed by: PHYSICIAN ASSISTANT

## 2024-05-22 PROCEDURE — 99213 OFFICE O/P EST LOW 20 MIN: CPT | Performed by: PHYSICIAN ASSISTANT

## 2024-05-22 RX ORDER — CEPHALEXIN 500 MG/1
500 CAPSULE ORAL 2 TIMES DAILY
Qty: 14 CAPSULE | Refills: 0 | Status: SHIPPED | OUTPATIENT
Start: 2024-05-22 | End: 2024-05-29

## 2024-05-22 NOTE — PROGRESS NOTES
"Assessment & Plan     Acute cystitis without hematuria  Acute problem.  On exam patient is in no acute distress.  Vitals are stable.  Patient is nontoxic-appearing.  UA suggestive of infection today. Keflex Rx. Urine culture is pending. Push fluids. We will communicate any changes to the antibiotic if need be based on the urine culture result. Follow up if any worsening symptoms. Patient agrees with the plan.     - cephALEXin (KEFLEX) 500 MG capsule  Dispense: 14 capsule; Refill: 0    Urinary problem  - UA Macroscopic with reflex to Microscopic and Culture - Lab Collect  - UA Microscopic with Reflex to Culture  - Urine Culture       Return in about 5 days (around 5/27/2024) for Symptoms failing to improve.    Shereen Nolan PA-C  [facility] [place] Ter[place] is a 68 year old female who presents to clinic today for the following health issues:  Chief Complaint   Patient presents with    Urgent Care     Urinary frequency, burning with urination started Sunday night (4days)       HPI    UTI    Onset of symptoms was 3 day(s) ago.  Course of illness is same  Severity moderate  Current and associated symptoms dysuria, frequency, and urgency  Treatment and measures tried None  Predisposing factors include none  Patient denies rigors, flank pain, hematuria, temperature > 101 degrees F., vomiting, vaginal discharge, vaginal odor, and vaginal itching        Review of Systems  Constitutional, HEENT, cardiovascular, pulmonary, GI, , musculoskeletal, neuro, skin, endocrine and psych systems are negative, except as otherwise noted.      Objective    /78   Pulse 86   Temp 97.7  F (36.5  C) (Oral)   Resp 16   Ht 1.664 m (5' 5.5\")   Wt 69.4 kg (153 lb)   LMP 03/20/2007   SpO2 99%   BMI 25.07 kg/m    Physical Exam   GENERAL: alert and no distress  RESP: lungs clear to auscultation - no rales, rhonchi or wheezes  CV: regular rate and rhythm, normal S1 S2  ABDOMEN: soft, nontender, " no hepatosplenomegaly, no masses and bowel sounds normal    Results for orders placed or performed in visit on 05/22/24 (from the past 24 hour(s))   UA Macroscopic with reflex to Microscopic and Culture - Lab Collect    Specimen: Urine, Clean Catch   Result Value Ref Range    Color Urine Yellow Colorless, Straw, Light Yellow, Yellow    Appearance Urine Clear Clear    Glucose Urine Negative Negative mg/dL    Bilirubin Urine Negative Negative    Ketones Urine Negative Negative mg/dL    Specific Gravity Urine <=1.005 1.003 - 1.035    Blood Urine Moderate (A) Negative    pH Urine 6.0 5.0 - 7.0    Protein Albumin Urine Negative Negative mg/dL    Urobilinogen Urine 0.2 0.2, 1.0 E.U./dL    Nitrite Urine Negative Negative    Leukocyte Esterase Urine Small (A) Negative   UA Microscopic with Reflex to Culture   Result Value Ref Range    Bacteria Urine Few (A) None Seen /HPF    RBC Urine 0-2 0-2 /HPF /HPF    WBC Urine 10-25 (A) 0-5 /HPF /HPF    Squamous Epithelials Urine Few (A) None Seen /LPF

## 2024-05-24 LAB
BACTERIA UR CULT: ABNORMAL
BACTERIA UR CULT: ABNORMAL

## 2024-06-24 ENCOUNTER — TRANSFERRED RECORDS (OUTPATIENT)
Dept: FAMILY MEDICINE | Facility: CLINIC | Age: 69
End: 2024-06-24

## 2024-06-26 ENCOUNTER — OFFICE VISIT (OUTPATIENT)
Dept: FAMILY MEDICINE | Facility: CLINIC | Age: 69
End: 2024-06-26

## 2024-06-26 VITALS
TEMPERATURE: 97.8 F | OXYGEN SATURATION: 96 % | DIASTOLIC BLOOD PRESSURE: 76 MMHG | BODY MASS INDEX: 24.91 KG/M2 | SYSTOLIC BLOOD PRESSURE: 114 MMHG | WEIGHT: 152 LBS | HEART RATE: 77 BPM

## 2024-06-26 DIAGNOSIS — K59.00 CONSTIPATION, UNSPECIFIED CONSTIPATION TYPE: Primary | ICD-10-CM

## 2024-06-26 PROCEDURE — 99213 OFFICE O/P EST LOW 20 MIN: CPT | Performed by: PHYSICIAN ASSISTANT

## 2024-06-26 NOTE — PROGRESS NOTES
Assessment & Plan     Constipation, unspecified constipation type-slowly improving, history of constipation, suspect recurrence of this, low concern for diverticulitis given pt had CT last week while symptoms were ongoing and nothing noted by oncologist. Will increase fiber and await improvement  Increase fiber intake indefinitely-Metamucil daily  Increase water  Call with worsening symptoms        Follow up as needed    No follow-ups on file.    Serafin Terrazas is a 68 year old, presenting for the following health issues:  Gastrointestinal Problem (Pt having what she thinks is diverticulitis. Located on the left side of her stomach. About 2.5 weeks ago it started. )    HPI     Pt presents with LLQ pain. Started 2 weeks ago, ate a bagel with pecans and pain started shortly after. Started epigastric, slowly has moved around abdomen. Today is the best she has felt. Having a BM daily, sometimes small amounts, sometimes large amounts. Using Miralax on occasion, rare laxative use. Has restricted fiber since this began. Hx constipation in the past.     Had CT abd/pelvis last week, normal, no signs of cancer.     Review of Systems  Constitutional, neuro, ENT, endocrine, pulmonary, cardiac, gastrointestinal, genitourinary, musculoskeletal, integument and psychiatric systems are negative, except as otherwise noted.      Objective    /76 (BP Location: Right arm, Patient Position: Sitting, Cuff Size: Adult Large)   Pulse 77   Temp 97.8  F (36.6  C) (Temporal)   Wt 68.9 kg (152 lb)   LMP 03/20/2007   SpO2 96%   BMI 24.91 kg/m    Body mass index is 24.91 kg/m .  Physical Exam   GENERAL: alert and no distress  NECK: no adenopathy, no asymmetry, masses, or scars  RESP: lungs clear to auscultation - no rales, rhonchi or wheezes  CV: regular rate and rhythm, normal S1 S2, no S3 or S4, no murmur, click or rub, no peripheral edema  ABDOMEN: soft, nontender, no hepatosplenomegaly, no masses and bowel sounds  normal  MS: no gross musculoskeletal defects noted, no edema  NEURO: Normal strength and tone, mentation intact and speech normal  PSYCH: mentation appears normal, affect normal/bright            Signed Electronically by: Bruce Thompson PA-C

## 2024-06-26 NOTE — NURSING NOTE
Chief Complaint   Patient presents with    Gastrointestinal Problem     Pt having what she thinks is diverticulitis. Located on the left side of her stomach. About 2.5 weeks ago it started.     Pre-visit Screening:  Immunizations:  up to date  Colonoscopy:  is up to date  Mammogram: is up to date  Asthma Action Test/Plan:    PHQ9:    GAD7:    Questioned patient about current smoking habits Pt. has never smoked.  Ok to leave detailed message on voice mail for today's visit only yes, phone #[phone]

## 2024-07-10 NOTE — PROGRESS NOTES
Preventive Care Visit  [facility], PEUGENIA Pennington MD, Family Medicine  Jul 11, 2024       SUBJECTIVE:   Nini is a 68 year old, presenting for the following:  Recheck Medication (Refill medication) and Physical (Fasting today, she is wondering if she should be taking calcium supplements)      HPI      Here for a physical. She is doing well. Fasting for labs. Also needs to have her thyroid checked and medications refilled. She is doing well on this.  Has been working with oncology for hodgkins lymphoma. No longer on chemotherapy.              Social History     Tobacco Use    Smoking status: Never     Passive exposure: Past    Smokeless tobacco: Never   Substance Use Topics    Alcohol use: Yes     Alcohol/week: 4.0 standard drinks of alcohol     Types: 4 Glasses of wine per week              No data to display            No concerns  Reviewed orders with patient.  Reviewed health maintenance and updated orders accordingly - Yes  BP Readings from Last 3 Encounters:   07/11/24 118/74   06/26/24 114/76   05/22/24 118/78    Wt Readings from Last 3 Encounters:   07/11/24 68.5 kg (151 lb)   06/26/24 68.9 kg (152 lb)   05/22/24 69.4 kg (153 lb)                  Patient Active Problem List   Diagnosis    Lumbago    Family history of other cardiovascular diseases    ACP (advance care planning)    Acquired hypothyroidism    Pure hypercholesterolemia    Unspecified severe protein-calorie malnutrition (H24)    Mixed cellularity Hodgkin lymphoma, unspecified body region (H)--followed on oncology     Past Surgical History:   Procedure Laterality Date    IR CHEST PORT PLACEMENT > 5 YRS OF AGE  02/20/2023    IR PORT REMOVAL RIGHT  09/06/2023    MALIGNANT SKIN LESION EXCISION Right 06/2019    melanoma right arm, [facility], Dr. Riley       Social History     Tobacco Use    Smoking status: Never     Passive exposure: Past    Smokeless tobacco: Never   Substance Use Topics    Alcohol use: Yes      "Alcohol/week: 4.0 standard drinks of alcohol     Types: 4 Glasses of wine per week     Family History   Problem Relation Age of Onset    Thyroid Disease Maternal Grandmother     Heart Disease Father          at age 47    Lipids Sister     Heart Disease Sister     Heart Disease Mother         valvular disease    Eye Disorder Mother         macular degeneration    C.A.D. Brother          71 yo, [place]    Rheumatoid Arthritis Sister          Current Outpatient Medications   Medication Sig Dispense Refill    Cholecalciferol (VITAMIN D) 1000 UNIT capsule Take 1 capsule by mouth daily.      lactobacillus rhamnosus, GG, (CULTURELL) capsule Take 1 capsule by mouth 2 times daily      levothyroxine (SYNTHROID/LEVOTHROID) 112 MCG tablet TAKE 1 TABLET BY MOUTH  DAILY 90 tablet 3    Multiple Vitamins-Minerals (PRESERVISION AREDS PO)          Breast Cancer Screening:    FHS-7:        No data to display              Mammogram referral done.    Pertinent mammograms are reviewed under the imaging tab.      History of abnormal Pap smear: pap is up to date            Reviewed and updated as needed this visit by clinical staff   Tobacco  Allergies   Problems             Reviewed and updated as needed this visit by Provider                  No past medical history on file.   Past Surgical History:   Procedure Laterality Date    IR CHEST PORT PLACEMENT > 5 YRS OF AGE  2023    IR PORT REMOVAL RIGHT  2023    MALIGNANT SKIN LESION EXCISION Right 2019    melanoma right arm, [facility], Dr. Riley     Review of Systems    Review of Systems  Constitutional, HEENT, cardiovascular, pulmonary, gi and gu systems are negative, except as otherwise noted.    OBJECTIVE:   /74 (BP Location: Right arm, Patient Position: Sitting, Cuff Size: Adult Regular)   Pulse 81   Temp 97.7  F (36.5  C) (Temporal)   Ht 1.664 m (5' 5.5\")   Wt 68.5 kg (151 lb)   LMP 2007   SpO2 98%   BMI 24.75 kg/m     Estimated body mass " "index is 24.75 kg/m  as calculated from the following:    Height as of this encounter: 1.664 m (5' 5.5\").    Weight as of this encounter: 68.5 kg (151 lb).  Physical Exam  GENERAL: alert and no distress  EYES: Eyes grossly normal to inspection, PERRL and conjunctivae and sclerae normal  HENT: ear canals and TM's normal, nose and mouth without ulcers or lesions  NECK: no adenopathy, no asymmetry, masses, or scars  RESP: lungs clear to auscultation - no rales, rhonchi or wheezes  BREAST: normal without masses, tenderness or nipple discharge and no palpable axillary masses or adenopathy  CV: regular rate and rhythm, normal S1 S2, no S3 or S4, no murmur, click or rub, no peripheral edema  ABDOMEN: soft, nontender, no hepatosplenomegaly, no masses and bowel sounds normal  MS: no gross musculoskeletal defects noted, no edema  SKIN: no suspicious lesions or rashes  NEURO: Normal strength and tone, mentation intact and speech normal  PSYCH: mentation appears normal, affect normal/bright  Pelvic: delined.    Diagnostic Test Results:  Labs reviewed in Epic  No results found for any visits on 07/11/24.    ASSESSMENT/PLAN:   1. Encounter for routine history and physical exam in female patient    - Comprehensive Metobolic Panel (BFP)  - Lipid Panel (BFP)    2. Medicare annual wellness visit, subsequent  Completed.    3. Acquired hypothyroidism  Check labs, refilled medications.  - levothyroxine (SYNTHROID/LEVOTHROID) 112 MCG tablet; TAKE 1 TABLET BY MOUTH  DAILY  Dispense: 90 tablet; Refill: 3  - VENOUS COLLECTION  - TSH WITH FREE T4 REFLEX ([facility])    4. Screening for osteoporosis    - DX Bone Density  - Radiology Referral (Affiliate Use Only)    5. Screening for colon cancer    - Colonoscopy Screening  Referral    6. Mixed cellularity Hodgkin lymphoma, unspecified body region (H)  Followed by oncology     Patient has been advised of split billing requirements and indicates understanding: Yes      Counseling  Reviewed " "preventive health counseling, as reflected in patient instructions       Regular exercise       Healthy diet/nutrition      BMI  Estimated body mass index is 24.75 kg/m  as calculated from the following:    Height as of this encounter: 1.664 m (5' 5.5\").    Weight as of this encounter: 68.5 kg (151 lb).         She reports that she has never smoked. She has been exposed to tobacco smoke. She has never used smokeless tobacco.          Signed Electronically by: Keysha Pennington MD  "

## 2024-07-11 ENCOUNTER — OFFICE VISIT (OUTPATIENT)
Dept: FAMILY MEDICINE | Facility: CLINIC | Age: 69
End: 2024-07-11

## 2024-07-11 VITALS
WEIGHT: 151 LBS | SYSTOLIC BLOOD PRESSURE: 118 MMHG | HEIGHT: 66 IN | BODY MASS INDEX: 24.27 KG/M2 | HEART RATE: 81 BPM | DIASTOLIC BLOOD PRESSURE: 74 MMHG | TEMPERATURE: 97.7 F | OXYGEN SATURATION: 98 %

## 2024-07-11 DIAGNOSIS — E03.9 ACQUIRED HYPOTHYROIDISM: ICD-10-CM

## 2024-07-11 DIAGNOSIS — Z12.11 SCREENING FOR COLON CANCER: ICD-10-CM

## 2024-07-11 DIAGNOSIS — Z13.820 SCREENING FOR OSTEOPOROSIS: ICD-10-CM

## 2024-07-11 DIAGNOSIS — C81.20 MIXED CELLULARITY HODGKIN LYMPHOMA, UNSPECIFIED BODY REGION (H): ICD-10-CM

## 2024-07-11 DIAGNOSIS — Z00.00 MEDICARE ANNUAL WELLNESS VISIT, SUBSEQUENT: ICD-10-CM

## 2024-07-11 DIAGNOSIS — Z00.00 ENCOUNTER FOR ROUTINE HISTORY AND PHYSICAL EXAM IN FEMALE PATIENT: Primary | ICD-10-CM

## 2024-07-11 PROBLEM — E43 UNSPECIFIED SEVERE PROTEIN-CALORIE MALNUTRITION (H): Status: ACTIVE | Noted: 2024-07-11

## 2024-07-11 PROBLEM — L03.313 CELLULITIS OF CHEST WALL: Status: RESOLVED | Noted: 2024-07-11 | Resolved: 2024-07-11

## 2024-07-11 PROBLEM — L03.313 CELLULITIS OF CHEST WALL: Status: ACTIVE | Noted: 2024-07-11

## 2024-07-11 LAB
ALBUMIN SERPL-MCNC: 4.2 G/DL (ref 3.6–5.1)
ALP SERPL-CCNC: 67 U/L (ref 33–130)
ALT 1742-6: 31 U/L (ref 0–32)
AST 1920-8: 23 U/L (ref 0–35)
BILIRUB SERPL-MCNC: 0.8 MG/DL (ref 0.2–1.2)
BUN SERPL-MCNC: 14 MG/DL (ref 7–25)
BUN/CREATININE RATIO: 17 (ref 6–32)
CALCIUM SERPL-MCNC: 9.3 MG/DL (ref 8.6–10.3)
CHLORIDE SERPLBLD-SCNC: 102.5 MMOL/L (ref 98–110)
CHOLEST SERPL-MCNC: 239 MG/DL (ref 0–199)
CHOLEST/HDLC SERPL: 2 {RATIO} (ref 0–5)
CO2 SERPL-SCNC: 29.2 MMOL/L (ref 20–32)
CREAT SERPL-MCNC: 0.83 MG/DL (ref 0.6–1.3)
GLUCOSE SERPL-MCNC: 92 MG/DL (ref 60–99)
HDLC SERPL-MCNC: 100 MG/DL (ref 40–150)
LDLC SERPL CALC-MCNC: 126 MG/DL
POTASSIUM SERPL-SCNC: 3.92 MMOL/L (ref 3.5–5.3)
PROT SERPL-MCNC: 6.3 G/DL (ref 6.1–8.1)
SODIUM SERPL-SCNC: 140.2 MMOL/L (ref 135–146)
TRIGL SERPL-MCNC: 66 MG/DL (ref 0–149)

## 2024-07-11 PROCEDURE — 99397 PER PM REEVAL EST PAT 65+ YR: CPT | Mod: 25 | Performed by: FAMILY MEDICINE

## 2024-07-11 PROCEDURE — 80053 COMPREHEN METABOLIC PANEL: CPT | Performed by: FAMILY MEDICINE

## 2024-07-11 PROCEDURE — 80061 LIPID PANEL: CPT | Performed by: FAMILY MEDICINE

## 2024-07-11 PROCEDURE — G0439 PPPS, SUBSEQ VISIT: HCPCS | Performed by: FAMILY MEDICINE

## 2024-07-11 PROCEDURE — 36415 COLL VENOUS BLD VENIPUNCTURE: CPT | Performed by: FAMILY MEDICINE

## 2024-07-11 RX ORDER — LACTOBACILLUS RHAMNOSUS GG 10B CELL
1 CAPSULE ORAL 2 TIMES DAILY
COMMUNITY

## 2024-07-11 RX ORDER — LEVOTHYROXINE SODIUM 112 UG/1
TABLET ORAL
Qty: 90 TABLET | Refills: 3 | Status: SHIPPED | OUTPATIENT
Start: 2024-07-11

## 2024-07-11 NOTE — NURSING NOTE
Chief Complaint   Patient presents with    Recheck Medication     Refill medication    Physical     Fasting today, she is wondering if she should be taking calcium supplements     Pre-visit Screening:  Immunizations:  up to date  Colonoscopy:  is due and ordered today  Mammogram: is up to date  Asthma Action Test/Plan:  RAYSHAWN  PHQ9:  NA  GAD7:  RAYSHAWN  Questioned patient about current smoking habits Pt. has never smoked.  Ok to leave detailed message on voice mail for today's visit only Yes, phone # [phone]

## 2024-07-11 NOTE — PROGRESS NOTES
Isidra Tomas is a 68 year old female who presents for Medicare Annual Wellness Visit.    Current providers caring for this patient include:  Patient Care Team:  Bruce Thompson PA-C as PCP - General (Family Medicine)  Elaina Staples PA-C as Assigned PCP    Complete Medical and Social history reviewed with patient, outlined below.    Patient Active Problem List   Diagnosis    Lumbago    Family history of other cardiovascular diseases    ACP (advance care planning)    Acquired hypothyroidism    Pure hypercholesterolemia       No past medical history on file.    Past Surgical History:   Procedure Laterality Date    COLONOSCOPY  2014    Normal/ repeat in 10 yrs    IR CHEST PORT PLACEMENT > 5 YRS OF AGE  2023    IR PORT REMOVAL RIGHT  2023    MALIGNANT SKIN LESION EXCISION Right 2019    melanoma right arm, [facility], Dr. Osvaldo KILGORE COLONOSCOPY THRU STOMA, DIAGNOSTIC  2005    Normal/ [facility]: repeat in 10 yrs       Family History   Problem Relation Age of Onset    Thyroid Disease Maternal Grandmother     Heart Disease Father          at age 47    Lipids Sister     Heart Disease Sister     Heart Disease Mother         valvular disease    Eye Disorder Mother         macular degeneration    C.A.D. Brother          69 yo, [place]    Rheumatoid Arthritis Sister        Social History     Tobacco Use    Smoking status: Never     Passive exposure: Past    Smokeless tobacco: Never   Substance Use Topics    Alcohol use: Yes     Alcohol/week: 4.0 standard drinks of alcohol     Types: 4 Glasses of wine per week       Diet: regular, low salt/low fat  Physical Activity: patient exercises 7 times weekly, walks regularly   Depression Screen:    Over the past 2 weeks, patient has felt down, depressed, or hopeless:  No    Over the past 2 weeks, patient has felt little interest or pleasure in doing things: No    Functional ability/Safety screen:  Up and go test (able to get up and walk  "longer than 30 seconds): Passed  Patient needs assistance with: nothing  Patient's home has the following possible safety concerns: none identified  Patient has concerns about her hearing:  No  Cognitive Screen  Patient repeats three objects (ball, flag, tree)      Clock drawing test:   NORMAL  Recalls three objects after 3 minutes (ball,flag,tree):                                                                                               recalls 3 objects (3 points)    Physical Exam:  /74 (BP Location: Right arm, Patient Position: Sitting, Cuff Size: Adult Regular)   Pulse 81   Temp 97.7  F (36.5  C) (Temporal)   Ht 1.664 m (5' 5.5\")   Wt 68.5 kg (151 lb)   LMP 03/20/2007   SpO2 98%   BMI 24.75 kg/m     Body mass index is 24.75 kg/m .        Health Maintenance   Topic Date Due    TSH W/FREE T4 REFLEX  12/14/2021    DEXA  05/04/2024    COVID-19 Vaccine (7 - 2023-24 season) 06/13/2024    MEDICARE ANNUAL WELLNESS VISIT  07/20/2024    LIPID  07/20/2024    COLORECTAL CANCER SCREENING  07/31/2024    RSV VACCINE (Pregnancy & 60+) (1 - 1-dose 60+ series) 06/26/2025 (Originally 10/7/2014)    INFLUENZA VACCINE (1) 09/01/2024    FALL RISK ASSESSMENT  07/11/2025    MAMMO SCREENING  11/07/2025    GLUCOSE  07/20/2026    ADVANCE CARE PLANNING  01/25/2028    DTAP/TDAP/TD IMMUNIZATION (3 - Td or Tdap) 09/30/2031    HEPATITIS C SCREENING  Completed    PHQ-2 (once per calendar year)  Completed    Pneumococcal Vaccine: 65+ Years  Completed    ZOSTER IMMUNIZATION  Completed    IPV IMMUNIZATION  Aged Out    HPV IMMUNIZATION  Aged Out    MENINGITIS IMMUNIZATION  Aged Out    RSV MONOCLONAL ANTIBODY  Aged Out         End of Life Planning:   Patient currently has an advanced directive: she has at home and will bring this in.    Education/Counseling:   Based on review of the above information, the following items were addressed:      Healthy diet and regular exercise.    Appropriate preventive services were discussed with " this patient, including applicable screening as appropriate for cardiovascular disease, diabetes, osteopenia/osteoporosis, and glaucoma.  As appropriate for age/gender, discussed screening for colorectal cancer, prostate cancer, breast cancer, and cervical cancer.   Checklist reviewing preventive services available has been given to the patient.    HM discussed and is up to date.

## 2024-07-11 NOTE — PATIENT INSTRUCTIONS
Health Maintenance   Topic Date Due    TSH W/FREE T4 REFLEX  12/14/2021    DEXA  05/04/2024    COVID-19 Vaccine (7 - 2023-24 season) 06/13/2024    MEDICARE ANNUAL WELLNESS VISIT  07/20/2024    LIPID  07/20/2024    COLORECTAL CANCER SCREENING  07/31/2024    RSV VACCINE (Pregnancy & 60+) (1 - 1-dose 60+ series) 06/26/2025 (Originally 10/7/2014)    INFLUENZA VACCINE (1) 09/01/2024    FALL RISK ASSESSMENT  07/11/2025    MAMMO SCREENING  11/07/2025    GLUCOSE  07/20/2026    ADVANCE CARE PLANNING  01/25/2028    DTAP/TDAP/TD IMMUNIZATION (3 - Td or Tdap) 09/30/2031    HEPATITIS C SCREENING  Completed    PHQ-2 (once per calendar year)  Completed    Pneumococcal Vaccine: 65+ Years  Completed    ZOSTER IMMUNIZATION  Completed    IPV IMMUNIZATION  Aged Out    HPV IMMUNIZATION  Aged Out    MENINGITIS IMMUNIZATION  Aged Out    RSV MONOCLONAL ANTIBODY  Aged Out

## 2024-07-12 LAB — TSH SERPL-ACNC: 1.33 MIU/L (ref 0.4–4.5)

## 2024-07-31 ENCOUNTER — TRANSFERRED RECORDS (OUTPATIENT)
Dept: FAMILY MEDICINE | Facility: CLINIC | Age: 69
End: 2024-07-31

## 2024-12-23 ENCOUNTER — TRANSFERRED RECORDS (OUTPATIENT)
Dept: FAMILY MEDICINE | Facility: CLINIC | Age: 69
End: 2024-12-23

## 2025-02-26 ENCOUNTER — OFFICE VISIT (OUTPATIENT)
Dept: FAMILY MEDICINE | Facility: CLINIC | Age: 70
End: 2025-02-26

## 2025-02-26 VITALS
SYSTOLIC BLOOD PRESSURE: 122 MMHG | TEMPERATURE: 97.8 F | DIASTOLIC BLOOD PRESSURE: 74 MMHG | HEART RATE: 65 BPM | OXYGEN SATURATION: 96 %

## 2025-02-26 DIAGNOSIS — H93.8X2 AUDIBLE HEARTBEAT IN LEFT EAR: Primary | ICD-10-CM

## 2025-02-26 NOTE — PROGRESS NOTES
"  Assessment & Plan     Audible heartbeat in left ear-L ear slightly erythematous, no infection, likely connected to her audible heartbeat in L ear. No other concerning signs/symptoms on exam, pt will monitor for worsening or any pain in ear and RTC if this occurs. Recheck thyroid today to see if connected  -Consider trial decongestant to see if this helps  - TSH ([facility])  - T4 FREE ([facility])  - VENOUS COLLECTION              Follow up as needed    No follow-ups on file.    Serafin Terrazas is a 69 year old, presenting for the following health issues:  Ear Problem (Pt states that she hears her \"heartbeat\" in her ear, for about 3-4 days and at nighttime./Pt is worried an has some anxiety that its  her hodgkin's lymphoma that was on her left side  )    HPI     Pt presents with hearing heartbeat in ear for last few days. Can hear this more at night when the room is very quiet. No other symptoms. L neck is a little sore today. Had CT scan 12/24, normal at that time. No recent illnesses.     's health is very stressful right now-he has extreme anxiety, difficult to deal with. As a result, she is much more anxious right now. Feels stress is manageable right now.         Review of Systems  Constitutional, neuro, ENT, endocrine, pulmonary, cardiac, gastrointestinal, genitourinary, musculoskeletal, integument and psychiatric systems are negative, except as otherwise noted.      Objective    /74 (BP Location: Left arm, Patient Position: Sitting, Cuff Size: Adult Large)   Pulse 65   Temp 97.8  F (36.6  C) (Oral)   LMP 03/20/2007   SpO2 96%   There is no height or weight on file to calculate BMI.  Physical Exam   GENERAL: alert and no distress  HENT: normal cephalic/atraumatic, right ear: normal: no effusions, no erythema, normal landmarks, left ear: erythematous, nose and mouth without ulcers or lesions, oropharynx clear, and oral mucous membranes moist.  NECK: no adenopathy, no asymmetry, masses, or scars. No " bruits heard on exam  RESP: lungs clear to auscultation - no rales, rhonchi or wheezes  CV: regular rate and rhythm, normal S1 S2, no S3 or S4, no murmur, click or rub, no peripheral edema  ABDOMEN: soft, nontender, no hepatosplenomegaly, no masses and bowel sounds normal  MS: no gross musculoskeletal defects noted, no edema  NEURO: Normal strength and tone, mentation intact and speech normal  PSYCH: mentation appears normal, affect normal/bright            Signed Electronically by: Bruce Thompson PA-C

## 2025-02-26 NOTE — NURSING NOTE
"Chief Complaint   Patient presents with    Ear Problem     Pt states that she hears her \"heartbeat\" in her ear, for about 3-4 days and at nighttime.  Pt is worried an has some anxiety that its  her hodgkin's lymphoma that was on her left side       Pre-visit Screening:  Immunizations:  up to date  Colonoscopy:  is up to date  Mammogram: is up to date  Asthma Action Test/Plan:  na  PHQ9:  na  GAD7:  na  Questioned patient about current smoking habits Pt. has never smoked.  Ok to leave detailed message on voice mail for today's visit only yes, phone # [phone] (home)       "

## 2025-02-27 LAB
T4, FREE, NON-DIALYSIS - QUEST: 1.5 NG/DL (ref 0.8–1.8)
TSH SERPL-ACNC: 1.17 MIU/L (ref 0.4–4.5)

## 2025-06-18 ENCOUNTER — TRANSFERRED RECORDS (OUTPATIENT)
Dept: HEALTH INFORMATION MANAGEMENT | Facility: CLINIC | Age: 70
End: 2025-06-18
Payer: COMMERCIAL

## 2025-06-27 ENCOUNTER — PATIENT OUTREACH (OUTPATIENT)
Dept: ONCOLOGY | Facility: CLINIC | Age: 70
End: 2025-06-27
Payer: COMMERCIAL

## 2025-06-27 NOTE — PROGRESS NOTES
New IP (Interventional Pulmonology) referral rec'd.  Chart reviewed.        New Patient: Interventional Pulmonary (Lung nodule) Nurse Navigator Note    Referring provider: Provider: Nika Loaiza   Affiliated with: [facility]     Referred to (specialty): Interventional Pulmonary (Lung nodule)    Requested provider (if applicable): n/a    Date Referral Received: 6/27/2025    Evaluation for:  To be performed within 1 Month.  New pulmonary nodule found on imaging.    Clinical History (per Nurse review of records provided):    **BOOK MARKED**    [facility]  6/18/2025:  CT Chest      Records Location: [facility] & [facility]    RECORDS NEEDED:  Last FIVE years CHEST imaging pushed to PACS from ??pt referred from [facility]----thank you!!    Additional testing needed prior to consult: none

## 2025-06-28 ENCOUNTER — PRE VISIT (OUTPATIENT)
Dept: ONCOLOGY | Facility: CLINIC | Age: 70
End: 2025-06-28
Payer: COMMERCIAL

## 2025-06-28 NOTE — TELEPHONE ENCOUNTER
RECORDS STATUS - ALL OTHER DIAGNOSIS      Action June 30, 2025 3:10 PM ABT   Action Taken Records from [facility] received and sent to HIM for upload and sent to  email for review.     RECORDS RECEIVED FROM:    DIAGNOSIS: Left hip pain [M25.552]    NOTES STATUS DETAILS   OFFICE NOTE from referring provider Ext: [facility] 06/25/25: Dr. Juan Odell   OFFICE NOTE from medical oncologist     OFFICE NOTE from other specialist     DISCHARGE SUMMARY from hospital     DISCHARGE REPORT from the ER     OPERATIVE REPORT [facility] 2/1/23: Lymph nodes, left neck posterior triangle, excision    MEDICATION LIST Ext: [facility]    LABS     PATHOLOGY REPORTS Reports in [facility] Surg Path:  02/01/23: [id number]    Flow Cytom:  02/01/23: [id number]   ANYTHING RELATED TO DIAGNOSIS Ext: [facility] 06/25/25   PATHOLOGY [employer] TRACKING   Tracking #:   GENONOMIC TESTING     TYPE: Ext 12/20/23: invitae   IMAGING (NEED IMAGES & REPORT)     CT SCANS PACS PACS:  12/07/17: CT Calcium    [facility]:  06/18/25: CT CAP   XRAYS PACS 02/15/23: XR Chest   ULTRASOUND PACS 0/21/23: US Chest   PET PACS [facility]:    [facility]:  08/21/23, 04/19/23, 02/15/23: PET CT

## 2025-07-03 NOTE — PROGRESS NOTES
Virtual Visit Details    Type of service:  Video Visit   Video Start Time: 8:29AM  Video End Time:8:41 AM    Originating Location (pt. Location): Home  Distant Location (provider location):  On-site  Platform used for Video Visit: Arti    LUNG NODULE & INTERVENTIONAL PULMONARY CLINIC  [facility]     Isidra Tomas MRN# [id number]   Age: 69 year old YOB: 1955     Reason for Consultation: Pulmonary nodules    Requesting Physician: BRANDY Carlson [facility]  [address] [facility] [address]  [place] [address]    Assessment and Plan:    1. 13 x 8mm LLL groundglass nodule  Seen on 6/2025 CT, new from 12/2024. Discussed possible etiologies, including most likely to be inflammation, and less likely related to her prior cancers.   --Repeat CT in 3 months     Shayy De Oliveira MD  Interventional Pulmonology  Department of Pulmonary, Allergy, Critical Care and Sleep Medicine   [facility]           History:     Isidra Tomas is a 69 year old female with sig h/o for Hodgkin's lymphoma and melanoma who is here for lung nodule.    Found to have a new lung nodule on routine surveillance for her Hodgkin's lymphoma.    - Denies any difficulty walking on flat ground or going up a flight of stairs. Denies any recent illnesses.    - Personal hx of cancer: Melanoma 2019 on arm, s/p excision, Hodgkin's lymphoma 2/2023 s/p 12 rounds of chemotherapy, getting 6 month surveillance scans  - Family hx of cancer: No history of lung cancer  - Tobacco hx: Never smoker  - My interpretation of the images relevant for this visit includes: 13 x 8mm LLL groundglass nodule   - My interpretation of the PFT's relevant for this visit includes: None         Allergies:      Allergies   Allergen Reactions    Erythromycin Diarrhea     does not tolerate well    Hydroxyzine Other (See Comments)     Swelling eyes          Medications:     Current Outpatient Medications    Medication Sig Dispense Refill    Cholecalciferol (VITAMIN D) 1000 UNIT capsule Take 1 capsule by mouth daily.      lactobacillus rhamnosus, GG, (CULTURELL) capsule Take 1 capsule by mouth 2 times daily      levothyroxine (SYNTHROID/LEVOTHROID) 112 MCG tablet TAKE 1 TABLET BY MOUTH  DAILY 90 tablet 3    Multiple Vitamins-Minerals (PRESERVISION AREDS PO)        No current facility-administered medications for this visit.            Physical Exam:     EYES: Eyes grossly normal to inspection.   NEURO: Cranial nerves grossly intact.  Mentation and speech appropriate for age.  GENERAL: Healthy, alert and no distress  RESP: No audible wheeze, cough  PSYCH: Mentation appears normal, affect normal, judgement and insight intact, normal speech and appearance well-groomed.     Imaging/Lab Data   All laboratory and imaging data reviewed.

## 2025-07-07 ENCOUNTER — DOCUMENTATION ONLY (OUTPATIENT)
Dept: PULMONOLOGY | Facility: CLINIC | Age: 70
End: 2025-07-07
Payer: COMMERCIAL

## 2025-07-07 PROBLEM — R91.8 GROUND GLASS OPACITY PRESENT ON IMAGING OF LUNG: Status: ACTIVE | Noted: 2025-07-07

## 2025-07-07 PROBLEM — D70.9 NEUTROPENIC DISORDER: Status: ACTIVE | Noted: 2023-05-23

## 2025-07-07 PROBLEM — E46 UNSPECIFIED PROTEIN-CALORIE MALNUTRITION: Status: ACTIVE | Noted: 2023-05-23

## 2025-07-07 PROBLEM — M25.552 PAIN OF LEFT HIP JOINT: Status: ACTIVE | Noted: 2024-12-20

## 2025-07-07 PROBLEM — C81.90 HODGKIN LYMPHOMA (H): Status: ACTIVE | Noted: 2023-02-01

## 2025-07-07 NOTE — NURSING NOTE
Pre-visit planning and chart review completed.     NEW patient appointment:    7/8 with Dr. Shayy De Oliveira  6/18 CT CAP    - No anticoagulation.  - Never smoker.  - Hx of Hodgkin's Lymphoma > follows with Dr. Odell at [facility].     CE updated. Medications, allergies, problem list, and immunizations reconciled.

## 2025-07-08 ENCOUNTER — VIRTUAL VISIT (OUTPATIENT)
Dept: PULMONOLOGY | Facility: CLINIC | Age: 70
End: 2025-07-08
Attending: NURSE PRACTITIONER
Payer: COMMERCIAL

## 2025-07-08 VITALS — WEIGHT: 159 LBS | BODY MASS INDEX: 25.55 KG/M2 | HEIGHT: 66 IN

## 2025-07-08 DIAGNOSIS — M25.552 LEFT HIP PAIN: ICD-10-CM

## 2025-07-08 DIAGNOSIS — R91.8 PULMONARY NODULES: Primary | ICD-10-CM

## 2025-07-08 PROCEDURE — 1126F AMNT PAIN NOTED NONE PRSNT: CPT | Mod: 95 | Performed by: STUDENT IN AN ORGANIZED HEALTH CARE EDUCATION/TRAINING PROGRAM

## 2025-07-08 PROCEDURE — 98002 SYNCH AUDIO-VIDEO NEW MOD 45: CPT | Performed by: STUDENT IN AN ORGANIZED HEALTH CARE EDUCATION/TRAINING PROGRAM

## 2025-07-08 ASSESSMENT — PAIN SCALES - GENERAL: PAINLEVEL_OUTOF10: NO PAIN (0)

## 2025-07-08 NOTE — LETTER
7/8/2025       RE: Isidra Tomas  [id number]  [facility] [address]     Dear Colleague,    Thank you for referring your patient, Isidra Tomas, to the [facility] at [facility]. Please see a copy of my visit note below.    Virtual Visit Details    Type of service:  Video Visit   Video Start Time: 8:29AM  Video End Time:8:41 AM    Originating Location (pt. Location): Home  Distant Location (provider location):  On-site  Platform used for Video Visit: [facility]    LUNG NODULE & INTERVENTIONAL PULMONARY CLINIC  [facility]     Isidra Tomas MRN# [id number]   Age: 69 year old YOB: 1955     Reason for Consultation: Pulmonary nodules    Requesting Physician: BRANDY Carlson [facility]  [address] [facility] [address]  [place] [address]    Assessment and Plan:    1. 13 x 8mm LLL groundglass nodule  Seen on 6/2025 CT, new from 12/2024. Discussed possible etiologies, including most likely to be inflammation, and less likely related to her prior cancers.   --Repeat CT in 3 months     Shayy De Oliveira MD  Interventional Pulmonology  Department of Pulmonary, Allergy, Critical Care and Sleep Medicine   [facility]           History:     Isidra Tomas is a 69 year old female with sig h/o for Hodgkin's lymphoma and melanoma who is here for lung nodule.    Found to have a new lung nodule on routine surveillance for her Hodgkin's lymphoma.    - Denies any difficulty walking on flat ground or going up a flight of stairs. Denies any recent illnesses.    - Personal hx of cancer: Melanoma 2019 on arm, s/p excision, Hodgkin's lymphoma 2/2023 s/p 12 rounds of chemotherapy, getting 6 month surveillance scans  - Family hx of cancer: No history of lung cancer  - Tobacco hx: Never smoker  - My interpretation of the images relevant for this visit includes: 13 x 8mm LLL groundglass  nodule   - My interpretation of the PFT's relevant for this visit includes: None         Allergies:      Allergies   Allergen Reactions     Erythromycin Diarrhea     does not tolerate well     Hydroxyzine Other (See Comments)     Swelling eyes          Medications:     Current Outpatient Medications   Medication Sig Dispense Refill     Cholecalciferol (VITAMIN D) 1000 UNIT capsule Take 1 capsule by mouth daily.       lactobacillus rhamnosus, GG, (CULTURELL) capsule Take 1 capsule by mouth 2 times daily       levothyroxine (SYNTHROID/LEVOTHROID) 112 MCG tablet TAKE 1 TABLET BY MOUTH  DAILY 90 tablet 3     Multiple Vitamins-Minerals (PRESERVISION AREDS PO)        No current facility-administered medications for this visit.            Physical Exam:     EYES: Eyes grossly normal to inspection.   NEURO: Cranial nerves grossly intact.  Mentation and speech appropriate for age.  GENERAL: Healthy, alert and no distress  RESP: No audible wheeze, cough  PSYCH: Mentation appears normal, affect normal, judgement and insight intact, normal speech and appearance well-groomed.     Imaging/Lab Data   All laboratory and imaging data reviewed.         Again, thank you for allowing me to participate in the care of your patient.      Sincerely,    Shayy De Oliveira MD

## 2025-07-08 NOTE — NURSING NOTE
Patient reviewed medications and allergies in Mychart during e-check in and said everything looked correct.      Current patient location: [id number]  [facility] [address]    Is the patient currently in the state of [place]? YES    Visit mode: VIDEO    If the visit is dropped, the patient can be reconnected by:VIDEO VISIT: Text to cell phone:   Telephone Information:   Mobile [phone]    and VIDEO VISIT: Send to e-mail at: [email or web address]    Will anyone else be joining the visit? Isabela's    (If patient encounters technical issues they should call [phone])    Are changes needed to the allergy or medication list? No    Are refills needed on medications prescribed by this physician? NO    Rooming Documentation:  Not applicable    Reason for visit: Consult    Karin LEWIS

## 2025-07-17 ENCOUNTER — OFFICE VISIT (OUTPATIENT)
Dept: FAMILY MEDICINE | Facility: CLINIC | Age: 70
End: 2025-07-17

## 2025-07-17 VITALS
SYSTOLIC BLOOD PRESSURE: 130 MMHG | BODY MASS INDEX: 25.68 KG/M2 | WEIGHT: 159.8 LBS | HEIGHT: 66 IN | DIASTOLIC BLOOD PRESSURE: 88 MMHG | HEART RATE: 65 BPM | OXYGEN SATURATION: 98 % | TEMPERATURE: 97 F

## 2025-07-17 DIAGNOSIS — Z13.228 SCREENING FOR METABOLIC DISORDER: ICD-10-CM

## 2025-07-17 DIAGNOSIS — Z00.00 MEDICARE ANNUAL WELLNESS VISIT, SUBSEQUENT: ICD-10-CM

## 2025-07-17 DIAGNOSIS — Z00.00 ENCOUNTER FOR GENERAL MEDICAL EXAMINATION: Primary | ICD-10-CM

## 2025-07-17 DIAGNOSIS — Z13.220 SCREENING FOR LIPID DISORDERS: ICD-10-CM

## 2025-07-17 DIAGNOSIS — E03.9 ACQUIRED HYPOTHYROIDISM: ICD-10-CM

## 2025-07-17 DIAGNOSIS — C81.20 MIXED CELLULARITY HODGKIN LYMPHOMA, UNSPECIFIED BODY REGION (H): ICD-10-CM

## 2025-07-17 PROBLEM — E43 UNSPECIFIED SEVERE PROTEIN-CALORIE MALNUTRITION: Status: RESOLVED | Noted: 2024-07-11 | Resolved: 2025-07-17

## 2025-07-17 PROBLEM — E46 UNSPECIFIED PROTEIN-CALORIE MALNUTRITION: Status: RESOLVED | Noted: 2023-05-23 | Resolved: 2025-07-17

## 2025-07-17 LAB
ALBUMIN SERPL-MCNC: 4 G/DL (ref 3.6–5.1)
ALP SERPL-CCNC: 55 U/L (ref 33–130)
ALT 1742-6: 27 U/L (ref 0–32)
AST 1920-8: 22 U/L (ref 0–35)
BILIRUB SERPL-MCNC: 0.6 MG/DL (ref 0.2–1.2)
BUN SERPL-MCNC: 15 MG/DL (ref 7–25)
BUN/CREATININE RATIO: 19 (ref 6–32)
CALCIUM SERPL-MCNC: 9.4 MG/DL (ref 8.6–10.3)
CHLORIDE SERPLBLD-SCNC: 103.6 MMOL/L (ref 98–110)
CHOLEST SERPL-MCNC: 247 MG/DL (ref 0–199)
CHOLEST/HDLC SERPL: 3 {RATIO} (ref 0–5)
CO2 SERPL-SCNC: 28.5 MMOL/L (ref 20–32)
CREAT SERPL-MCNC: 0.78 MG/DL (ref 0.6–1.3)
GLUCOSE SERPL-MCNC: 99 MG/DL (ref 60–99)
HDLC SERPL-MCNC: 98 MG/DL (ref 40–150)
LDLC SERPL CALC-MCNC: 135 MG/DL (ref 0–129)
POTASSIUM SERPL-SCNC: 4.16 MMOL/L (ref 3.5–5.3)
PROT SERPL-MCNC: 6 G/DL (ref 6.1–8.1)
SODIUM SERPL-SCNC: 140.2 MMOL/L (ref 135–146)
TRIGL SERPL-MCNC: 71 MG/DL (ref 0–149)

## 2025-07-17 PROCEDURE — 99397 PER PM REEVAL EST PAT 65+ YR: CPT | Mod: 25 | Performed by: PHYSICIAN ASSISTANT

## 2025-07-17 PROCEDURE — G0439 PPPS, SUBSEQ VISIT: HCPCS | Performed by: PHYSICIAN ASSISTANT

## 2025-07-17 PROCEDURE — 80053 COMPREHEN METABOLIC PANEL: CPT | Performed by: PHYSICIAN ASSISTANT

## 2025-07-17 PROCEDURE — 80061 LIPID PANEL: CPT | Performed by: PHYSICIAN ASSISTANT

## 2025-07-17 PROCEDURE — 36415 COLL VENOUS BLD VENIPUNCTURE: CPT | Performed by: PHYSICIAN ASSISTANT

## 2025-07-17 RX ORDER — LEVOTHYROXINE SODIUM 112 UG/1
TABLET ORAL
Qty: 90 TABLET | Refills: 3 | Status: SHIPPED | OUTPATIENT
Start: 2025-07-17

## 2025-07-17 NOTE — PROGRESS NOTES
Chief Complaint:  Physical Exam    SUBJECTIVE:   Isidra Tomas is a 69 year old female presents for routine health maintenance.    Current concerns: None     Menses are absent    Patient's last menstrual period was 2007.    Was last Pap smear normal: Yes  Due for mammogram:  Yes    Body mass index is 26.19 kg/m .    Present exercise habits:  1-3 times/week  Present dietary habits:  eats regular meals and follows a balanced nutrition diet    Calcium intake:  2-3 servings plus  Vit D intake: is taking supplement    Is the patient a smoker? No  If yes, smoking cessation advised and counseling provided.     Cardiovascular risk factors: none    Over the past few weeks, have you felt down or depressed? Little interest or pleasure in doing things? No concerns. No depression. Some situational anxiety.     If in a relationship are there any Domestic violence concern: No    Last dental appointment:  this year  Last optical appointment:  this year    Was the patient born between [birth year] and has not had Hep C testing?  Has not been tested, declines testing    I have reviewed the following histories: Past Medical History, Past Surgical History, Social History, Family History, Problem List, Medication List and Allergies    No past medical history on file.  Family History   Problem Relation Age of Onset    Heart Disease Mother         valvular disease    Eye Disorder Mother         macular degeneration    Heart Disease Father          at age 47    Lipids Sister     Heart Disease Sister     Rheumatoid Arthritis Sister     Anxiety Disorder Sister     Depression Sister     C.A.D. Brother          69 yo, [place]    Thyroid Disease Maternal Grandmother      Social History     Socioeconomic History    Marital status:      Spouse name: Not on file    Number of children: Not on file    Years of education: Not on file    Highest education level: Not on file   Occupational History    Not on file   Tobacco Use    Smoking  status: Never     Passive exposure: Past    Smokeless tobacco: Never   Substance and Sexual Activity    Alcohol use: Yes     Alcohol/week: 4.0 standard drinks of alcohol     Types: 4 Glasses of wine per week    Drug use: No    Sexual activity: Yes     Partners: Male   Other Topics Concern    Parent/sibling w/ CABG, MI or angioplasty before 65F 55M? Not Asked   Social History Narrative    Not on file     Social Drivers of Health     Financial Resource Strain: Not on file   Food Insecurity: Not on file   Transportation Needs: Not on file   Physical Activity: Not on file   Stress: Not on file   Social Connections: Not on file   Interpersonal Safety: Not on file   Housing Stability: Not on file     Past Surgical History:   Procedure Laterality Date    IR CHEST PORT PLACEMENT > 5 YRS OF AGE  02/20/2023    IR PORT REMOVAL RIGHT  09/06/2023    MALIGNANT SKIN LESION EXCISION Right 06/2019    melanoma right arm, [facility], Dr. Riley       ROS:  E/M: NEGATIVE for ear, nose, mouth and throat problems  R: NEGATIVE for significant/chronic cough or SOB  CV: NEGATIVE for chest pain or palpitations  GI: NEGATIVE for abdominal pain, chronic diarrhea or constipation  :  NEGATIVE for dysuria, hematuria or vaginal discharge. No sexual health concerns.       Current Outpatient Medications   Medication Sig Dispense Refill    Cholecalciferol (VITAMIN D) 1000 UNIT capsule Take 1 capsule by mouth daily.      lactobacillus rhamnosus, GG, (CULTURELL) capsule Take 1 capsule by mouth 2 times daily      levothyroxine (SYNTHROID/LEVOTHROID) 112 MCG tablet TAKE 1 TABLET BY MOUTH  DAILY 90 tablet 3    Multiple Vitamins-Minerals (PRESERVISION AREDS PO)        No current facility-administered medications for this visit.       Patient Active Problem List    Diagnosis Date Noted    Ground glass opacity present on imaging of lung 07/07/2025     Priority: Medium    Pain of left hip joint 12/20/2024     Priority: Medium    Mixed cellularity  "Hodgkin lymphoma, unspecified body region (H)--followed on oncology 07/11/2024     Priority: Medium    Neutropenic disorder 05/23/2023     Priority: Medium    Hodgkin lymphoma (H) 02/01/2023     Priority: Medium    Pure hypercholesterolemia 08/08/2020     Priority: Medium    Acquired hypothyroidism 08/06/2020     Priority: Medium    ACP (advance care planning) 07/09/2012     Priority: Medium               Family history of other cardiovascular diseases 04/29/2005     Priority: Medium     Problem list name updated by automated process. Provider to review and confirm  Problem list name updated by automated process. Provider to review      Lumbago 04/01/2004     Priority: Medium       OBJECTIVE:  /88 (BP Location: Right arm, Patient Position: Sitting, Cuff Size: Adult Regular)   Pulse 65   Temp 97  F (36.1  C) (Temporal)   Ht 1.664 m (5' 5.5\")   Wt 72.5 kg (159 lb 12.8 oz)   LMP 03/20/2007   SpO2 98%   BMI 26.19 kg/m        General: 69 year old female who appears her stated age. Vital signs noted  Head: Normocephalic  Eyes: pupils equal round reactive to light and accomodation, extra ocular movements intact  Ears: external canals and TMs free of abnormalities  Nose: patent, without mucosal abnormalities  Mouth and throat: without erythema or lesions of the mucosa  Neck: supple, without adenopathy or thyromegaly  Lungs: clear to auscultation, no wheezing or crackles  Breasts:  Declined. No concerns.   Cv: regular rate and rhythm, normal s1 and s2 without murmur or click  Abd: soft, non-tender, no masses, no hepatomegaly or splenomegaly.   (female): Declined. No concerns.   Ms: normal muscle tone & symmetry  Skin: clear to inspection and with no palpable abnormalities.  Neuro: sensation and motor function grossly intact; cranial nerves without obvious abnormalities.    ASSESSMENT/PLAN:    Encounter for general medical examination  Nini is doing well today. Will update fasting labs and TSH today. Will " "send MyChart with results. Will refill medication without change for 1 year.     Acquired hypothyroidism  - levothyroxine (SYNTHROID/LEVOTHROID) 112 MCG tablet; TAKE 1 TABLET BY MOUTH  DAILY  - VENOUS COLLECTION  - TSH WITH FREE T4 REFLEX ([facility])    Screening for lipid disorders  - VENOUS COLLECTION  - Lipid Panel ([facility])    Screening for metabolic disorder  - VENOUS COLLECTION  - Comprehensive Metobolic Panel ([facility])    Mixed cellularity Hodgkin lymphoma, unspecified body region (H)--followed on oncology  Continue working with oncology once yearly.        reports that she has never smoked. She has been exposed to tobacco smoke. She has never used smokeless tobacco.    Estimated body mass index is 26.19 kg/m  as calculated from the following:    Height as of this encounter: 1.664 m (5' 5.5\").    Weight as of this encounter: 72.5 kg (159 lb 12.8 oz).  Weight management plan: Discussed healthy diet and exercise guidelines      Labs pending:      Fasting glucose      Fasting lipids  Meds Suggested:      Vitamin D       Calcium  Tests Recommended:      Regular Dental Examinations        Eye exam        Mammogram yearly  Behavior Modifications:       Cardiovascular exercise 3 times per week--enough to get your Target Heart rate  Other recommendations:     BMI noted and discussed      Regular breast exam     Encouraged My Chart    Counseling Resources:  ATP IV Guidelines  Pooled Cohorts Equation Calculator  Breast Cancer Risk Calculator  FRAX Risk Assessment  ICSI Preventive Guidelines  Dietary Guidelines for Americans, 2010  USDA's MyPlate            Elaina Staples PA-C  7/17/2025    "

## 2025-07-17 NOTE — NURSING NOTE
Chief Complaint   Patient presents with    Physical     CPX fasting, thyroid med check and refill     Medicare Visit     AWV     Pre-visit Screening:  Immunizations:  up to date  Colonoscopy:  is up to date  Mammogram: is up to date  Asthma Action Test/Plan:  na  PHQ9:  phq2 given  GAD7:  na  Questioned patient about current smoking habits Pt. has never smoked.  Ok to leave detailed message on voice mail for today's visit only yes, phone # [phone] (home)

## 2025-07-17 NOTE — PROGRESS NOTES
Isidra Tomas is a 69 year old female who presents for Medicare Annual Wellness Visit.    Current providers caring for this patient include:  Patient Care Team:  Keysha Pennington MD as PCP - General (Family Medicine)  Keysha Pennington MD as Assigned PCP  Shayy De Oliveira MD as MD (Pulmonary & Critical Care Medicine)  Nika Loaiza APRN CNP as Nurse Practitioner (Hematology & Oncology)  Juan Odell MD as MD (Hematology & Oncology)    Complete Medical and Social history reviewed with patient, outlined below.    Patient Active Problem List   Diagnosis    Lumbago    Family history of other cardiovascular diseases    ACP (advance care planning)    Acquired hypothyroidism    Pure hypercholesterolemia    Unspecified severe protein-calorie malnutrition    Mixed cellularity Hodgkin lymphoma, unspecified body region (H)--followed on oncology    Hodgkin lymphoma (H)    Neutropenic disorder    Pain of left hip joint    Unspecified protein-calorie malnutrition    Ground glass opacity present on imaging of lung       No past medical history on file.    Past Surgical History:   Procedure Laterality Date    IR CHEST PORT PLACEMENT > 5 YRS OF AGE  2023    IR PORT REMOVAL RIGHT  2023    MALIGNANT SKIN LESION EXCISION Right 2019    melanoma right arm, [facility], Dr. Riley       Family History   Problem Relation Age of Onset    Thyroid Disease Maternal Grandmother     Heart Disease Father          at age 47    Lipids Sister     Heart Disease Sister     Heart Disease Mother         valvular disease    Eye Disorder Mother         macular degeneration    C.A.D. Brother          71 yo, [place]    Rheumatoid Arthritis Sister        Social History     Tobacco Use    Smoking status: Never     Passive exposure: Past    Smokeless tobacco: Never   Substance Use Topics    Alcohol use: Yes     Alcohol/week: 4.0 standard drinks of alcohol     Types: 4 Glasses of wine per week       Diet: regular, low  "salt/low fat  Physical Activity: tries to walk everyday - unless it's too hot   Depression Screen:    Over the past 2 weeks, patient has felt down, depressed, or hopeless:  No    Over the past 2 weeks, patient has felt little interest or pleasure in doing things: No    Functional ability/Safety screen:  Up and go test (able to get up and walk longer than 30 seconds): Passed  Patient needs assistance with: nothing, fully independent  Patient's home has the following possible safety concerns: none identified  Patient has concerns about her hearing:  No  Cognitive Screen  Patient repeats three objects (kaley, apple, table)      Clock drawing test:   NORMAL  Recalls three objects after 3 minutes (kaley, apple, table):                                                                                               recalls 2 objects (2 points)    Physical Exam:  /88 (BP Location: Right arm, Patient Position: Sitting, Cuff Size: Adult Regular)   Pulse 65   Temp 97  F (36.1  C) (Temporal)   Ht 1.664 m (5' 5.5\")   Wt 72.5 kg (159 lb 12.8 oz)   LMP 03/20/2007   SpO2 98%   BMI 26.19 kg/m     Body mass index is 26.19 kg/m .     End of Life Planning:   Patient currently has an advanced directive: Yes.  Practitioner is supportive of decision.    Education/Counseling:   Based on review of the above information, the following items were addressed:  BP mildly elevated- has been under more stress.     Appropriate preventive services were discussed with this patient, including applicable screening as appropriate for cardiovascular disease, diabetes, osteopenia/osteoporosis, and glaucoma.  As appropriate for age/gender, discussed screening for colorectal cancer, prostate cancer, breast cancer, and cervical cancer.   Checklist reviewing preventive services available has been given to the patient.    Elaina Staples PA-C  7/17/2025             "